# Patient Record
Sex: MALE | Race: OTHER | HISPANIC OR LATINO | URBAN - METROPOLITAN AREA
[De-identification: names, ages, dates, MRNs, and addresses within clinical notes are randomized per-mention and may not be internally consistent; named-entity substitution may affect disease eponyms.]

---

## 2018-07-04 ENCOUNTER — INPATIENT (INPATIENT)
Facility: HOSPITAL | Age: 43
LOS: 2 days | Discharge: HOME | DRG: 864 | End: 2018-07-07
Attending: INTERNAL MEDICINE | Admitting: INTERNAL MEDICINE
Payer: COMMERCIAL

## 2018-07-04 VITALS — HEIGHT: 62 IN | WEIGHT: 160.06 LBS

## 2018-07-04 DIAGNOSIS — R78.81 BACTEREMIA: ICD-10-CM

## 2018-07-04 DIAGNOSIS — Z29.9 ENCOUNTER FOR PROPHYLACTIC MEASURES, UNSPECIFIED: ICD-10-CM

## 2018-07-04 DIAGNOSIS — R50.9 FEVER, UNSPECIFIED: ICD-10-CM

## 2018-07-04 DIAGNOSIS — R79.89 OTHER SPECIFIED ABNORMAL FINDINGS OF BLOOD CHEMISTRY: ICD-10-CM

## 2018-07-04 LAB
ALBUMIN SERPL ELPH-MCNC: 3.5 G/DL — SIGNIFICANT CHANGE UP (ref 3.5–5)
ALP SERPL-CCNC: 71 U/L — SIGNIFICANT CHANGE UP (ref 40–120)
ALT FLD-CCNC: 92 U/L DA — HIGH (ref 10–60)
ANION GAP SERPL CALC-SCNC: 9 MMOL/L — SIGNIFICANT CHANGE UP (ref 5–17)
APPEARANCE UR: CLEAR — SIGNIFICANT CHANGE UP
AST SERPL-CCNC: 96 U/L — HIGH (ref 10–40)
BACTERIA # UR AUTO: ABNORMAL /HPF
BASOPHILS # BLD AUTO: 0 K/UL — SIGNIFICANT CHANGE UP (ref 0–0.2)
BASOPHILS NFR BLD AUTO: 1 % — SIGNIFICANT CHANGE UP (ref 0–2)
BILIRUB SERPL-MCNC: 0.5 MG/DL — SIGNIFICANT CHANGE UP (ref 0.2–1.2)
BILIRUB UR-MCNC: NEGATIVE — SIGNIFICANT CHANGE UP
BUN SERPL-MCNC: 10 MG/DL — SIGNIFICANT CHANGE UP (ref 7–18)
CALCIUM SERPL-MCNC: 7.9 MG/DL — LOW (ref 8.4–10.5)
CHLORIDE SERPL-SCNC: 104 MMOL/L — SIGNIFICANT CHANGE UP (ref 96–108)
CK SERPL-CCNC: 1100 U/L — HIGH (ref 35–232)
CO2 SERPL-SCNC: 26 MMOL/L — SIGNIFICANT CHANGE UP (ref 22–31)
COLOR SPEC: YELLOW — SIGNIFICANT CHANGE UP
CREAT SERPL-MCNC: 1.16 MG/DL — SIGNIFICANT CHANGE UP (ref 0.5–1.3)
DIFF PNL FLD: ABNORMAL
EOSINOPHIL # BLD AUTO: 0 K/UL — SIGNIFICANT CHANGE UP (ref 0–0.5)
EOSINOPHIL NFR BLD AUTO: 0.1 % — SIGNIFICANT CHANGE UP (ref 0–6)
GLUCOSE SERPL-MCNC: 87 MG/DL — SIGNIFICANT CHANGE UP (ref 70–99)
GLUCOSE UR QL: NEGATIVE — SIGNIFICANT CHANGE UP
HCT VFR BLD CALC: 45.1 % — SIGNIFICANT CHANGE UP (ref 39–50)
HGB BLD-MCNC: 14.8 G/DL — SIGNIFICANT CHANGE UP (ref 13–17)
KETONES UR-MCNC: NEGATIVE — SIGNIFICANT CHANGE UP
LACTATE SERPL-SCNC: 1.5 MMOL/L — SIGNIFICANT CHANGE UP (ref 0.7–2)
LEUKOCYTE ESTERASE UR-ACNC: NEGATIVE — SIGNIFICANT CHANGE UP
LYMPHOCYTES # BLD AUTO: 1.6 K/UL — SIGNIFICANT CHANGE UP (ref 1–3.3)
LYMPHOCYTES # BLD AUTO: 38.9 % — SIGNIFICANT CHANGE UP (ref 13–44)
MCHC RBC-ENTMCNC: 30.3 PG — SIGNIFICANT CHANGE UP (ref 27–34)
MCHC RBC-ENTMCNC: 32.9 GM/DL — SIGNIFICANT CHANGE UP (ref 32–36)
MCV RBC AUTO: 92 FL — SIGNIFICANT CHANGE UP (ref 80–100)
MONOCYTES # BLD AUTO: 0.6 K/UL — SIGNIFICANT CHANGE UP (ref 0–0.9)
MONOCYTES NFR BLD AUTO: 13.2 % — SIGNIFICANT CHANGE UP (ref 2–14)
NEUTROPHILS # BLD AUTO: 1.9 K/UL — SIGNIFICANT CHANGE UP (ref 1.8–7.4)
NEUTROPHILS NFR BLD AUTO: 46.8 % — SIGNIFICANT CHANGE UP (ref 43–77)
NITRITE UR-MCNC: NEGATIVE — SIGNIFICANT CHANGE UP
PH UR: 6.5 — SIGNIFICANT CHANGE UP (ref 5–8)
PLATELET # BLD AUTO: 214 K/UL — SIGNIFICANT CHANGE UP (ref 150–400)
POTASSIUM SERPL-MCNC: 3.9 MMOL/L — SIGNIFICANT CHANGE UP (ref 3.5–5.3)
POTASSIUM SERPL-SCNC: 3.9 MMOL/L — SIGNIFICANT CHANGE UP (ref 3.5–5.3)
PROT SERPL-MCNC: 7.4 G/DL — SIGNIFICANT CHANGE UP (ref 6–8.3)
PROT UR-MCNC: NEGATIVE — SIGNIFICANT CHANGE UP
RBC # BLD: 4.9 M/UL — SIGNIFICANT CHANGE UP (ref 4.2–5.8)
RBC # FLD: 11.7 % — SIGNIFICANT CHANGE UP (ref 10.3–14.5)
RBC CASTS # UR COMP ASSIST: SIGNIFICANT CHANGE UP /HPF (ref 0–2)
SODIUM SERPL-SCNC: 139 MMOL/L — SIGNIFICANT CHANGE UP (ref 135–145)
SP GR SPEC: 1.01 — SIGNIFICANT CHANGE UP (ref 1.01–1.02)
UROBILINOGEN FLD QL: NEGATIVE — SIGNIFICANT CHANGE UP
WBC # BLD: 4.2 K/UL — SIGNIFICANT CHANGE UP (ref 3.8–10.5)
WBC # FLD AUTO: 4.2 K/UL — SIGNIFICANT CHANGE UP (ref 3.8–10.5)
WBC UR QL: SIGNIFICANT CHANGE UP /HPF (ref 0–5)

## 2018-07-04 PROCEDURE — 71046 X-RAY EXAM CHEST 2 VIEWS: CPT | Mod: 26

## 2018-07-04 PROCEDURE — 99285 EMERGENCY DEPT VISIT HI MDM: CPT

## 2018-07-04 RX ORDER — ONDANSETRON 8 MG/1
4 TABLET, FILM COATED ORAL EVERY 6 HOURS
Qty: 0 | Refills: 0 | Status: DISCONTINUED | OUTPATIENT
Start: 2018-07-04 | End: 2018-07-07

## 2018-07-04 RX ORDER — SODIUM CHLORIDE 9 MG/ML
2500 INJECTION INTRAMUSCULAR; INTRAVENOUS; SUBCUTANEOUS ONCE
Qty: 0 | Refills: 0 | Status: COMPLETED | OUTPATIENT
Start: 2018-07-04 | End: 2018-07-04

## 2018-07-04 RX ORDER — SODIUM CHLORIDE 9 MG/ML
1000 INJECTION, SOLUTION INTRAVENOUS
Qty: 0 | Refills: 0 | Status: DISCONTINUED | OUTPATIENT
Start: 2018-07-04 | End: 2018-07-07

## 2018-07-04 RX ORDER — CEFTRIAXONE 500 MG/1
1 INJECTION, POWDER, FOR SOLUTION INTRAMUSCULAR; INTRAVENOUS ONCE
Qty: 0 | Refills: 0 | Status: COMPLETED | OUTPATIENT
Start: 2018-07-04 | End: 2018-07-04

## 2018-07-04 RX ORDER — IBUPROFEN 200 MG
400 TABLET ORAL EVERY 8 HOURS
Qty: 0 | Refills: 0 | Status: DISCONTINUED | OUTPATIENT
Start: 2018-07-04 | End: 2018-07-05

## 2018-07-04 RX ORDER — AZITHROMYCIN 500 MG/1
500 TABLET, FILM COATED ORAL ONCE
Qty: 0 | Refills: 0 | Status: COMPLETED | OUTPATIENT
Start: 2018-07-04 | End: 2018-07-04

## 2018-07-04 RX ORDER — IBUPROFEN 200 MG
600 TABLET ORAL ONCE
Qty: 0 | Refills: 0 | Status: COMPLETED | OUTPATIENT
Start: 2018-07-04 | End: 2018-07-04

## 2018-07-04 RX ORDER — ONDANSETRON 8 MG/1
4 TABLET, FILM COATED ORAL ONCE
Qty: 0 | Refills: 0 | Status: COMPLETED | OUTPATIENT
Start: 2018-07-04 | End: 2018-07-04

## 2018-07-04 RX ADMIN — SODIUM CHLORIDE 2500 MILLILITER(S): 9 INJECTION INTRAMUSCULAR; INTRAVENOUS; SUBCUTANEOUS at 13:50

## 2018-07-04 RX ADMIN — SODIUM CHLORIDE 75 MILLILITER(S): 9 INJECTION, SOLUTION INTRAVENOUS at 21:38

## 2018-07-04 RX ADMIN — Medication 600 MILLIGRAM(S): at 19:09

## 2018-07-04 RX ADMIN — AZITHROMYCIN 250 MILLIGRAM(S): 500 TABLET, FILM COATED ORAL at 17:16

## 2018-07-04 RX ADMIN — Medication 600 MILLIGRAM(S): at 17:18

## 2018-07-04 RX ADMIN — CEFTRIAXONE 100 GRAM(S): 500 INJECTION, POWDER, FOR SOLUTION INTRAMUSCULAR; INTRAVENOUS at 15:20

## 2018-07-04 RX ADMIN — ONDANSETRON 4 MILLIGRAM(S): 8 TABLET, FILM COATED ORAL at 17:20

## 2018-07-04 NOTE — ED PROVIDER NOTE - MUSCULOSKELETAL, MLM
Spine appears normal, range of motion is not limited, no muscle or joint tenderness. Pt is tremulous.

## 2018-07-04 NOTE — ED ADULT NURSE NOTE - ED STAT RN HANDOFF DETAILS 3
Patient resting comfortably  tolerated breakfast well, independent with ADL, endorsed to MARU Aponte

## 2018-07-04 NOTE — H&P ADULT - PROBLEM SELECTOR PLAN 3
IMPROVE VTE Individual Risk Assessment    RISK                                                          Points  [] Previous VTE                                           3  [] Thrombophilia                                        2  [] Lower limb paralysis                              2   [] Current Cancer                                       2   [] Immobilization > 24 hrs                        1  [] ICU/CCU stay > 24 hours                       1  [] Age > 60                                                   1    IMPROVE VTE Score: 0  no need for DVT ppx  no need for GI ppx

## 2018-07-04 NOTE — H&P ADULT - ASSESSMENT
Patient is a 42 y/o M from home, , with no significant PMH presents to ED c/o fever and chills. Pt also had associated headache,  body and muscle aches, nausea, NBNB vomiting X3, dry cough, diaphoresis, soft stool, no watery diarrhea, no wt loss. ER course, temp 101.6, HR 66, /83, improved to /60 w/o intervention. RR 18, SO2 97%. Labs wnl except LFT mild elevated , AST 96, ALT 92, CXR shows No evidence for focal infiltrate or lobar consolidation. UA negative. S/P rocephin 1gm x1 and Zithromax 500 mg x1 , NS bolus 2.5L in ER.     Pt will be admitted to medicine for further eval of fever of unknown origin. Patient is a 44 y/o M from home, , with no significant PMH presents to ED c/o fever and chills. Pt also had associated headache, body/muscle aches, joints pain, nausea, NBNB vomiting X3, dry cough, diaphoresis, soft stool, no watery diarrhea, no sore throat, no wt loss. ER course, temp 101.6, HR 66, /83, improved to /60 w/o intervention. RR 18, SO2 97%. Labs wnl except LFT mild elevated , AST 96, ALT 92, CXR shows No evidence for focal infiltrate or lobar consolidation. UA negative. S/P rocephin 1gm x1 and Zithromax 500 mg x1 , NS bolus 2.5L in ER.     Pt will be admitted to medicine for further eval of fever of unknown origin.

## 2018-07-04 NOTE — H&P ADULT - PROBLEM SELECTOR PLAN 2
mild elevated LFTs AST 96, ALT 92  deny drinks alcohol   f/u hepatitis panel and alcohol level  repeat LFT in am mild elevated LFTs AST 96, ALT 92  deny drinks alcohol   f/u hepatitis panel , alcohol level and lipid profile   repeat LFT in am

## 2018-07-04 NOTE — H&P ADULT - PROBLEM SELECTOR PLAN 1
fever, chills, headache,  body and muscle aches, nausea, NBNB vomiting X3, dry cough, diaphoresis, recent travelled to [place]  likely due to viral infection vs bacteria infection vs TB   febrile 101.6, no leukocytosis, UA negative   CXR shows No evidence for focal infiltrate or lobar consolidation  S/P rocephin 1gm x1 and Zithromax 500 mg x1 , NS bolus 2.5L in ER  f/u Blood culture  f/u HIV , QuantiFeron TB, RVP, hepatitis panel , Utox and lactate level   ID consulted with Dr. Zuniga fever, chills, headache,  body/muscle aches, joints pain, nausea, NBNB vomiting X3, dry cough, diaphoresis, recent travelled to [place]  likely due to viral infection vs bacteria infection vs TB   febrile 101.6, no leukocytosis, UA negative   CXR shows No evidence for focal infiltrate or lobar consolidation  S/P rocephin 1gm x1 and Zithromax 500 mg x1 , NS bolus 2.5L in ER  f/u Blood culture  f/u HIV , QuantiFeron TB, RVP, hepatitis panel , Utox and lactate level   hold ABx for now, will continue to monitor  c/w gentle hydration  ID consulted with Dr. Waters fever, chills, headache,  body/muscle aches, joints pain, nausea, NBNB vomiting X3, dry cough, diaphoresis, recent travelled to [place]  likely due to viral infection vs bacteria infection vs TB   febrile 101.6, no leukocytosis, UA negative , lactate NL  CXR shows No evidence for focal infiltrate or lobar consolidation  S/P rocephin 1gm x1 and Zithromax  1gm x1 , NS bolus 2.5L in ER  f/u Blood culture x2 set sent  f/u HIV , QuantiFeron TB, RVP, hepatitis panel , Utox , CK levels   f/u EBV serology, stool GI PCR panel and mycoplasma IgM titers   hold ABx for now, will continue to monitor  c/w gentle hydration  ID consulted with Dr. Waters

## 2018-07-04 NOTE — H&P ADULT - HISTORY OF PRESENT ILLNESS
44 y/o M pt with no significant PMHx presents to ED c/o body aches, chills, vomiting, and cough x 5 days. Pt was in [place] for 4 days, and got sick a day after being in [place]. While in [place], pt was started on a antiemetic and abx (Cipro). Pt states he did not finish course of medication because it made him feel worse. Pt denies any alcohol use, stating adamantly "I do not drink." Pt was evaluated by [facility] and sent to ED for further evaluation. Pt denies diarrhea, chest pain, shortness of breath, rash or any other complaints. ALLERGIES: penicillin (hives) Patient is a 44 y/o M from home, ,  with no significant PMH presents to ED c/o fever and chills. Pt was in [place] last Thursday and got sick on Saturday, he was started on a antiemetic and Cipro (took 2 days, not finish course of medication because it made him feel worse.) , body aches, chills, vomiting, and cough x 5 days. Pt was in [place] for 4 days, and got sick a day after being in [place]. While in [place], pt was started . Pt states he did not finish course of medication because it made him feel worse. Pt denies any alcohol use, stating adamantly "I do not drink." Pt was evaluated by [facility] and sent to ED for further evaluation. Pt denies diarrhea, chest pain, shortness of breath, rash or any other complaints. ALLERGIES: penicillin (hives) Patient is a 42 y/o M from home, , with no significant PMH presents to ED c/o fever and chills. Pt was in [place] last Thursday and got sick on Saturday, he was started on a antiemetic and Cipro (took 2 days, not finish course of medication because it made him feel worse.). Pt returned to [place] on Monday. Pt also had associated headache,  body and muscle aches, nausea, NBNB vomiting X3, dry cough, diaphoresis, soft stool, no watery diarrhea, no wt loss. Pt denies any alcohol use, drug abuse, never smoker, no sick contact. Pt was evaluated by [facility]  and sent to ED for further evaluation, Pt denies chest pain, SOB, abd pain, or any other complains.  ALLERGIES: penicillin (hives)    ER course, temp 101.6, HR 66, /83, improved to /60 w/o intervention. RR 18, SO2 97%. Labs wnl except LFT mild elevated , AST 96, ALT 92, CXR shows No evidence for focal infiltrate or lobar consolidation. UA negative. S/P rocephin 1gm x1 and Zithromax 500 mg x1 , NS bolus 2.5L in ER. Patient is a 42 y/o M from home, , with no significant PMH presents to ED c/o fever and chills. Pt was in [place] last Thursday and got sick on Saturday, he was started on a antiemetic and Cipro (took 2 days, not finish course of medication because it made him feel worse.). Pt returned to [place] on Monday. Pt also had associated headache, body/muscle aches, joints pain, nausea, NBNB vomiting X3, dry cough, diaphoresis, soft stool, no watery diarrhea, no sore throat, no wt loss. Pt denies any alcohol use, drug abuse, never smoker, no sick contact. Pt was evaluated by [facility]  and sent to ED for further evaluation, Pt denies chest pain, SOB, abd pain, or any other complains.  Allergic to penicillin (hives), pt not on any meds at home.    ER course, temp 101.6, HR 66, /83, improved to /60 w/o intervention. RR 18, SO2 97%. Labs wnl except LFT mild elevated , AST 96, ALT 92, CXR shows No evidence for focal infiltrate or lobar consolidation. UA negative. S/P rocephin 1gm x1 and Zithromax 500 mg x1 , NS bolus 2.5L in ER. Patient is a 44 y/o M from home, , with no significant PMH presents to ED c/o fever and chills. Pt was in [place] last Thursday and got sick on Saturday, he was started on a antiemetic and Cipro (took 2 days, not finish course of medication because it made him feel worse.). Pt returned to [place] on Monday. Pt also had associated headache, body/muscle aches, joints pain, nausea, NBNB vomiting X3, dry cough, diaphoresis, soft stool, no watery diarrhea, no sore throat, no wt loss. Pt denies any alcohol use, drug abuse, never smoker, no sick contact. Pt was evaluated by [facility]  and sent to ED for further evaluation, Pt denies chest pain, SOB, abd pain, or any other complains.  Allergic to penicillin (hives), pt not on any meds at home.    ER course, temp 101.6, HR 66, /83, improved to /60 w/o intervention. RR 18, SO2 97%. Labs wnl except LFT mild elevated , AST 96, ALT 92, CXR shows No evidence for focal infiltrate or lobar consolidation. UA negative. S/P rocephin 1gm x1 and Zithromax 1gm x1 , NS bolus 2.5L in ER.

## 2018-07-04 NOTE — ED PROVIDER NOTE - OBJECTIVE STATEMENT
44 y/o M pt with no significant PMHx presents to ED c/o body aches, chills, vomiting, and cough x 5 days. Pt was in [place] for 4 days, and got sick a day after being in [place]. While in [place], pt was started on a antiemetic and abx (Cipro). Pt states he did not finish course of medication because it made him feel worse. Pt denies any alcohol use, stating adamantly "I do not drink." Pt was evaluated by [facility] and sent to ED for further evaluation. Pt denies diarrhea, chest pain, shortness of breath, rash or any other complaints. ALLERGIES: penicillin (hives).

## 2018-07-04 NOTE — H&P ADULT - NSHPLABSRESULTS_GEN_ALL_CORE
Comprehensive Metabolic Panel (07.04.18 @ 14:17)    Sodium, Serum: 139 mmol/L    Potassium, Serum: 3.9 mmol/L    Chloride, Serum: 104 mmol/L    Carbon Dioxide, Serum: 26 mmol/L    Anion Gap, Serum: 9 mmol/L    Blood Urea Nitrogen, Serum: 10 mg/dL    Creatinine, Serum: 1.16 mg/dL    Glucose, Serum: 87 mg/dL    Calcium, Total Serum: 7.9 mg/dL    Protein Total, Serum: 7.4 g/dL    Albumin, Serum: 3.5 g/dL    Bilirubin Total, Serum: 0.5 mg/dL    Alkaline Phosphatase, Serum: 71 U/L    Aspartate Aminotransferase (AST/SGOT): 96 U/L    Alanine Aminotransferase (ALT/SGPT): 92 U/L DA    eGFR if Non : 77: Interpretative comment  The units for eGFR are ml/min/1.73m2 (normalized body surface area). The  eGFR is calculated from a serum creatinine using the CKD-EPI equation.  Other variables required for calculation are race, age and sex. Among  patients with chronic kidney disease (CKD), the eGFR is useful in  determining the stage of disease according to KDOQI CKD classification.  All eGFR results are reported numerically with the following  interpretation.          GFR                    With                 Without     (ml/min/1.73 m2)    Kidney Damage       Kidney Damage        >= 90                    Stage 1                     Normal        60-89                    Stage 2                     Decreased GFR        30-59     Stage 3                     Stage 3        15-29                    Stage 4                     Stage 4        < 15                      Stage 5                     Stage 5  Each stage of CKD assumes that the associated GFR level has been in  effect for at least 3 months. Determination of stages one and two (with  eGFR > 59 ml/min/m2) requires estimation of kidney damage for at least 3  months as defined by structural or functional abnormalities.  Limitations: All estimates of GFR will be less accurate for patients at  extremes of muscle mass (including but not limited to frail elderly,  critically ill, or cancer patients), those with unusual diets, and those  with conditions associated with reduced secretion or extrarenal  elimination of creatinine. The eGFR equation is not recommended for use  in patients with unstable creatinine levels. mL/min/1.73M2    eGFR if African American: 89 mL/min/1.73M2      Complete Blood Count + Automated Diff (07.04.18 @ 14:17)    WBC Count: 4.2 K/uL    RBC Count: 4.90 M/uL    Hemoglobin: 14.8 g/dL    Hematocrit: 45.1 %    Mean Cell Volume: 92.0 fl    Mean Cell Hemoglobin: 30.3 pg    Mean Cell Hemoglobin Conc: 32.9 gm/dL    Red Cell Distrib Width: 11.7 %    Platelet Count - Automated: 214 K/uL    Auto Neutrophil #: 1.9 K/uL    Auto Lymphocyte #: 1.6 K/uL    Auto Monocyte #: 0.6 K/uL    Auto Eosinophil #: 0.0 K/uL    Auto Basophil #: 0.0 K/uL    Auto Neutrophil %: 46.8: Differential percentages must be correlated with absolute numbers for  clinical significance. %    Auto Lymphocyte %: 38.9 %    Auto Monocyte %: 13.2 %    Auto Eosinophil %: 0.1 %    Auto Basophil %: 1.0 %      < from: Xray Chest 2 Views PA/Lat (07.04.18 @ 13:54) >    FINDINGS: Heart size appears within normal limits. No hilar or superior   mediastinal abnormalities are identified.    There is no evidence for focal infiltrate, lobar consolidation or   pulmonary edema. No pleural effusion or pneumothorax is demonstrated. No   mediastinal shift is noted. The visualized osseous structures appear   unremarkable.    IMPRESSION: No evidence for focal infiltrate or lobar consolidation.    < end of copied text >

## 2018-07-04 NOTE — ED ADULT NURSE NOTE - ED STAT RN HANDOFF DETAILS 2
Patient endorsed to DOUGLAS Espinoza in stable condition and in no acute distress. Pending bed assignment.

## 2018-07-05 LAB
24R-OH-CALCIDIOL SERPL-MCNC: 17.1 NG/ML — LOW (ref 30–80)
ALBUMIN SERPL ELPH-MCNC: 3.1 G/DL — LOW (ref 3.5–5)
ALP SERPL-CCNC: 64 U/L — SIGNIFICANT CHANGE UP (ref 40–120)
ALT FLD-CCNC: 79 U/L DA — HIGH (ref 10–60)
ANION GAP SERPL CALC-SCNC: 7 MMOL/L — SIGNIFICANT CHANGE UP (ref 5–17)
AST SERPL-CCNC: 71 U/L — HIGH (ref 10–40)
BASOPHILS # BLD AUTO: 0 K/UL — SIGNIFICANT CHANGE UP (ref 0–0.2)
BASOPHILS NFR BLD AUTO: 0.7 % — SIGNIFICANT CHANGE UP (ref 0–2)
BILIRUB SERPL-MCNC: 0.5 MG/DL — SIGNIFICANT CHANGE UP (ref 0.2–1.2)
BUN SERPL-MCNC: 8 MG/DL — SIGNIFICANT CHANGE UP (ref 7–18)
CALCIUM SERPL-MCNC: 8.1 MG/DL — LOW (ref 8.4–10.5)
CHLORIDE SERPL-SCNC: 108 MMOL/L — SIGNIFICANT CHANGE UP (ref 96–108)
CHOLEST SERPL-MCNC: 110 MG/DL — SIGNIFICANT CHANGE UP (ref 10–199)
CK SERPL-CCNC: 841 U/L — HIGH (ref 35–232)
CO2 SERPL-SCNC: 25 MMOL/L — SIGNIFICANT CHANGE UP (ref 22–31)
CREAT SERPL-MCNC: 0.95 MG/DL — SIGNIFICANT CHANGE UP (ref 0.5–1.3)
CULTURE RESULTS: NO GROWTH — SIGNIFICANT CHANGE UP
EBV EA AB SER IA-ACNC: <5 U/ML — SIGNIFICANT CHANGE UP
EBV EA AB TITR SER IF: POSITIVE
EBV EA IGG SER-ACNC: NEGATIVE — SIGNIFICANT CHANGE UP
EBV NA IGG SER IA-ACNC: 112 U/ML — HIGH
EBV PATRN SPEC IB-IMP: SIGNIFICANT CHANGE UP
EBV VCA IGG AVIDITY SER QL IA: POSITIVE
EBV VCA IGM SER IA-ACNC: 47.6 U/ML — HIGH
EBV VCA IGM SER IA-ACNC: <10 U/ML — SIGNIFICANT CHANGE UP
EBV VCA IGM TITR FLD: NEGATIVE — SIGNIFICANT CHANGE UP
EOSINOPHIL # BLD AUTO: 0 K/UL — SIGNIFICANT CHANGE UP (ref 0–0.5)
EOSINOPHIL NFR BLD AUTO: 0.4 % — SIGNIFICANT CHANGE UP (ref 0–6)
ETHANOL SERPL-MCNC: <10 MG/DL — SIGNIFICANT CHANGE UP (ref 0–10)
GLUCOSE SERPL-MCNC: 95 MG/DL — SIGNIFICANT CHANGE UP (ref 70–99)
HAV IGM SER-ACNC: SIGNIFICANT CHANGE UP
HBA1C BLD-MCNC: 5.6 % — SIGNIFICANT CHANGE UP (ref 4–5.6)
HBV CORE IGM SER-ACNC: SIGNIFICANT CHANGE UP
HBV SURFACE AG SER-ACNC: SIGNIFICANT CHANGE UP
HCT VFR BLD CALC: 44.8 % — SIGNIFICANT CHANGE UP (ref 39–50)
HCV AB S/CO SERPL IA: 0.08 S/CO — SIGNIFICANT CHANGE UP
HCV AB SERPL-IMP: SIGNIFICANT CHANGE UP
HDLC SERPL-MCNC: 32 MG/DL — LOW (ref 40–125)
HGB BLD-MCNC: 14.9 G/DL — SIGNIFICANT CHANGE UP (ref 13–17)
HIV 1 & 2 AB SERPL IA.RAPID: SIGNIFICANT CHANGE UP
LIPID PNL WITH DIRECT LDL SERPL: 58 MG/DL — SIGNIFICANT CHANGE UP
LYMPHOCYTES # BLD AUTO: 1.7 K/UL — SIGNIFICANT CHANGE UP (ref 1–3.3)
LYMPHOCYTES # BLD AUTO: 33.9 % — SIGNIFICANT CHANGE UP (ref 13–44)
M PNEUMO IGM SER-ACNC: 77 UNITS/ML — SIGNIFICANT CHANGE UP
MCHC RBC-ENTMCNC: 30.7 PG — SIGNIFICANT CHANGE UP (ref 27–34)
MCHC RBC-ENTMCNC: 33.3 GM/DL — SIGNIFICANT CHANGE UP (ref 32–36)
MCV RBC AUTO: 92.1 FL — SIGNIFICANT CHANGE UP (ref 80–100)
MONOCYTES # BLD AUTO: 0.5 K/UL — SIGNIFICANT CHANGE UP (ref 0–0.9)
MONOCYTES NFR BLD AUTO: 9.2 % — SIGNIFICANT CHANGE UP (ref 2–14)
MYCOPLASMA AG SPEC QL: NEGATIVE — SIGNIFICANT CHANGE UP
NEUTROPHILS # BLD AUTO: 2.8 K/UL — SIGNIFICANT CHANGE UP (ref 1.8–7.4)
NEUTROPHILS NFR BLD AUTO: 55.9 % — SIGNIFICANT CHANGE UP (ref 43–77)
PLATELET # BLD AUTO: 192 K/UL — SIGNIFICANT CHANGE UP (ref 150–400)
POTASSIUM SERPL-MCNC: 4.2 MMOL/L — SIGNIFICANT CHANGE UP (ref 3.5–5.3)
POTASSIUM SERPL-SCNC: 4.2 MMOL/L — SIGNIFICANT CHANGE UP (ref 3.5–5.3)
PROT SERPL-MCNC: 6.5 G/DL — SIGNIFICANT CHANGE UP (ref 6–8.3)
RAPID RVP RESULT: SIGNIFICANT CHANGE UP
RBC # BLD: 4.86 M/UL — SIGNIFICANT CHANGE UP (ref 4.2–5.8)
RBC # FLD: 12 % — SIGNIFICANT CHANGE UP (ref 10.3–14.5)
SODIUM SERPL-SCNC: 140 MMOL/L — SIGNIFICANT CHANGE UP (ref 135–145)
SPECIMEN SOURCE: SIGNIFICANT CHANGE UP
TOTAL CHOLESTEROL/HDL RATIO MEASUREMENT: 3.4 RATIO — SIGNIFICANT CHANGE UP (ref 3.4–9.6)
TRIGL SERPL-MCNC: 98 MG/DL — SIGNIFICANT CHANGE UP (ref 10–149)
TSH SERPL-MCNC: 0.52 UU/ML — SIGNIFICANT CHANGE UP (ref 0.34–4.82)
WBC # BLD: 5.1 K/UL — SIGNIFICANT CHANGE UP (ref 3.8–10.5)
WBC # FLD AUTO: 5.1 K/UL — SIGNIFICANT CHANGE UP (ref 3.8–10.5)

## 2018-07-05 PROCEDURE — 71046 X-RAY EXAM CHEST 2 VIEWS: CPT | Mod: 26

## 2018-07-05 PROCEDURE — 93010 ELECTROCARDIOGRAM REPORT: CPT

## 2018-07-05 RX ORDER — ACETAMINOPHEN 500 MG
650 TABLET ORAL EVERY 6 HOURS
Qty: 0 | Refills: 0 | Status: DISCONTINUED | OUTPATIENT
Start: 2018-07-05 | End: 2018-07-07

## 2018-07-05 RX ADMIN — SODIUM CHLORIDE 75 MILLILITER(S): 9 INJECTION, SOLUTION INTRAVENOUS at 07:06

## 2018-07-05 NOTE — PROGRESS NOTE ADULT - PROBLEM SELECTOR PLAN 1
RVP & acute hepatitis panel negative  CXR shows No evidence for focal infiltrate or lobar consolidation  S/p Rocephin and Zithromax currently not on abx   S/p NS bolus x i and maintenance  Bld cx & QuantiFeron TB, EBV serology, mycoplasma IgM titers pending-f/u results  Stool GI PCR panel not collected, requested again-f/u collection and results   ID following, appreciated RVP & acute hepatitis panel negative  CXR shows No evidence for focal infiltrate or lobar consolidation  S/p Rocephin and Zithromax currently not on abx   S/p NS bolus x i and maintenance  Bld cx & QuantiFeron TB, EBV serology, mycoplasma IgM titers pending-f/u results  Stool GI PCR panel not collected, requested again-f/u collection and results   ID following, appreciated-Requested repeat CXR b/c on ID resp exam reported crakles CXR ordered urgently

## 2018-07-05 NOTE — PROGRESS NOTE ADULT - ATTENDING COMMENTS
Agreed and concurred on the above.  As chest ascultation is revealing varied areas or congestion, rattles and crackles will proceed with CT Chest w/ IV contrast. Will cont to monitor.

## 2018-07-05 NOTE — CONSULT NOTE ADULT - SUBJECTIVE AND OBJECTIVE BOX
Time of visit:    CHIEF COMPLAINT: Patient is a 43y old  Male who presents with a chief complaint of fever , chills (2018 20:45)      HPI:  Patient is a 42 y/o M from home, , with no significant PMH presents to ED c/o fever and chills. Pt was in [employer] last Thursday and got sick on Saturday, he was started on a antiemetic and Cipro (took 2 days, not finish course of medication because it made him feel worse.). Pt returned to [place] on Monday. Pt also had associated headache, body/muscle aches, joints pain, nausea, NBNB vomiting X3, dry cough, diaphoresis, soft stool, no watery diarrhea, no sore throat, no wt loss. Pt denies any alcohol use, drug abuse, never smoker, no sick contact. Pt was evaluated by [facility]  and sent to ED for further evaluation, Pt denies chest pain, SOB, abd pain, or any other complains.  Allergic to penicillin (hives), pt not on any meds at home.    ER course, temp 101.6, HR 66, /83, improved to /60 w/o intervention. RR 18, SO2 97%. Labs wnl except LFT mild elevated , AST 96, ALT 92, CXR shows No evidence for focal infiltrate or lobar consolidation. UA negative. S/P rocephin 1gm x1 and Zithromax 1gm x1 , NS bolus 2.5L in ER. (2018 20:45)   Patient seen and examined.     PAST MEDICAL & SURGICAL HISTORY:  No pertinent past medical history  No significant past surgical history      Allergies    penicillin (Hives)    Intolerances        MEDICATIONS  (STANDING):  dextrose 5% + sodium chloride 0.9%. 1000 milliLiter(s) (75 mL/Hr) IV Continuous <Continuous>      MEDICATIONS  (PRN):  acetaminophen   Tablet 650 milliGRAM(s) Oral every 6 hours PRN For Temp greater than 38 C (100.4 F)  ondansetron Injectable 4 milliGRAM(s) IV Push every 6 hours PRN Nausea and/or Vomiting   Medications up to date at time of exam.    Medications up to date at time of exam.    FAMILY HISTORY:  No pertinent family history in first degree relatives      SOCIAL HISTORY  Smoking History: Denies smoking, nor second smoke exposure.  Living Condition: [   ] apartment, [   ] private house  Work History:   Travel History: denies recent travel  Illicit Substance Use: denies  Alcohol Use: denies    REVIEW OF SYSTEMS:    CONSTITUTIONAL:  Had episode of  fever, no fever on exam. No chills,  night sweats, weight loss    HEENT:  denies diplopia or blurred vision, sore throat or runny nose.    CARDIOVASCULAR:  denies pressure, squeezing, tightness, or heaviness about the chest; no palpitations.    RESPIRATORY:  No  SOB, UMAÑA, wheezing. Has non productive cough.    GASTROINTESTINAL:  denies abdominal pain, nausea, vomiting or diarrhea.    GENITOURINARY: denies dysuria, frequency or urgency.    NEUROLOGIC:  denies numbness, tingling, seizures or weakness.    PSYCHIATRIC:  denies disorder of thought or mood.    MSK: denies swelling, redness      PHYSICAL EXAMINATION:      Vital Signs Last 24 Hrs  T(C): 36.6 (2018 11:24), Max: 37.2 (2018 15:52)  T(F): 97.8 (2018 11:24), Max: 99 (2018 15:52)  HR: 69 (2018 11:24) (66 - 85)  BP: 108/59 (2018 11:24) (91/78 - 114/54)  BP(mean): --  RR: 16 (2018 11:24) (16 - 18)  SpO2: 98% (2018 11:24) (98% - 100%)   (if applicable)    General: Alert and oriented x 3. No acute distress. Able to answer question appropriately with no SOB.    HEENT: Normocephalic and atraumatic. No nasal tenderness.  Extraocular muscles are intact. Moist mucosa.     NECK: Supple, no palpable adenopathy.    LUNGS: Clear to auscultation, no wheezing, rales, or rhonchi. No use of accessory muscle.      HEART: S1 S2 Regular rate and no click/ rub.     ABDOMEN: Soft, nontender, and nondistended.  No hepatosplenomegaly is noted. Active bowel sounds.     EXTREMITIES: Without any cyanosis, clubbing, rash, lesions or edema.    NEUROLOGIC: Awake, alert, oriented.     SKIN: Warm and moist. Non diaphoretic.       LABS:                        14.9   5.1   )-----------( 192      ( 2018 05:43 )             44.8         140  |  108  |  8   ----------------------------<  95  4.2   |  25  |  0.95    Ca    8.1<L>      2018 05:43    TPro  6.5  /  Alb  3.1<L>  /  TBili  0.5  /  DBili  0.2  /  AST  71<H>  /  ALT  79<H>  /  AlkPhos  64  -      Urinalysis Basic - ( 2018 14:17 )    Color: Yellow / Appearance: Clear / S.010 / pH: x  Gluc: x / Ketone: Negative  / Bili: Negative / Urobili: Negative   Blood: x / Protein: Negative / Nitrite: Negative   Leuk Esterase: Negative / RBC: 0-2 /HPF / WBC 0-2 /HPF   Sq Epi: x / Non Sq Epi: x / Bacteria: Trace /HPF        CARDIAC MARKERS ( 2018 05:43 )  x     / x     / 841 U/L / x     / x      CARDIAC MARKERS ( 2018 22:11 )  x     / x     / 1100 U/L / x     / x        RADIOLOGY & ADDITIONAL STUDIES:  EKG:   CXR: < from: Xray Chest 2 Views PA/Lat (18 @ 13:54) >  INTERPRETATION:  INDICATION: fever and chills    PRIORS: None    VIEWS: Frontal and lateral radiographs of the chest performed.    FINDINGS: Heart size appears within normal limits. No hilar or superior   mediastinal abnormalities are identified.    There is no evidence for focal infiltrate, lobar consolidation or   pulmonary edema. No pleural effusion or pneumothorax is demonstrated. No   mediastinal shift is noted. The visualized osseous structures appear   unremarkable.    IMPRESSION: No evidence for focal infiltrate or lobar consolidation.     PAST MEDICAL & SURGICAL HISTORY:  No pertinent past medical history  No significant past surgical history    Impression; 42 Y/O Male from Home. Presented with fever and chills associated with body ache, headache, joint pain, nausea, diaphoresis . Has dry non productive cough. Just got back from [place] and claimed that he had PPD test couple of years back and result is negative. Sleeps 1 pillow and had sick contact when he was in [place]. He verbalized that he felt sick and did not finish his course of antibiotic because it made him feel worse. CXR with no acute findings. RVP negative.       Suggestion;    Can have Oxygen supplementation if needed.   BLD Cx, Quantiferon TB pending results.   Reinforced importance of compliance to Daily medications. Time of visit:    CHIEF COMPLAINT: Patient is a 43y old  Male who presents with a chief complaint of fever , chills (2018 20:45)      HPI:  Patient is a 42 y/o M from home, , with no significant PMH presents to ED c/o fever and chills. Pt was in [place] last Thursday and got sick on Saturday, he was started on a antiemetic and Cipro (took 2 days, not finish course of medication because it made him feel worse.). Pt returned to [place] on Monday. Pt also had associated headache, body/muscle aches, joints pain, nausea, NBNB vomiting X3, dry cough, diaphoresis, soft stool, no watery diarrhea, no sore throat, no wt loss. Pt denies any alcohol use, drug abuse, never smoker, no sick contact. Pt was evaluated by [facility]  and sent to ED for further evaluation, Pt denies chest pain, SOB, abd pain, or any other complains.  Allergic to penicillin (hives), pt not on any meds at home.    ER course, temp 101.6, HR 66, /83, improved to /60 w/o intervention. RR 18, SO2 97%. Labs wnl except LFT mild elevated , AST 96, ALT 92, CXR shows No evidence for focal infiltrate or lobar consolidation. UA negative. S/P rocephin 1gm x1 and Zithromax 1gm x1 , NS bolus 2.5L in ER. (2018 20:45)   Patient seen and examined.     PAST MEDICAL & SURGICAL HISTORY:  No pertinent past medical history  No significant past surgical history      Allergies    penicillin (Hives)    Intolerances        MEDICATIONS  (STANDING):  dextrose 5% + sodium chloride 0.9%. 1000 milliLiter(s) (75 mL/Hr) IV Continuous <Continuous>      MEDICATIONS  (PRN):  acetaminophen   Tablet 650 milliGRAM(s) Oral every 6 hours PRN For Temp greater than 38 C (100.4 F)  ondansetron Injectable 4 milliGRAM(s) IV Push every 6 hours PRN Nausea and/or Vomiting   Medications up to date at time of exam.    Medications up to date at time of exam.    FAMILY HISTORY:  No pertinent family history in first degree relatives      SOCIAL HISTORY  Smoking History: Denies smoking, nor second smoke exposure.  Living Condition: [   ] apartment, [   ] private house  Work History:   Travel History: denies recent travel  Illicit Substance Use: denies  Alcohol Use: denies    REVIEW OF SYSTEMS:    CONSTITUTIONAL:  Had episode of  fever, no fever on exam. No chills,  night sweats, weight loss    HEENT:  denies diplopia or blurred vision, sore throat or runny nose.    CARDIOVASCULAR:  denies pressure, squeezing, tightness, or heaviness about the chest; no palpitations.    RESPIRATORY:  No  SOB, UMAÑA, wheezing. Has non productive cough.    GASTROINTESTINAL:  denies abdominal pain, nausea, vomiting or diarrhea.    GENITOURINARY: denies dysuria, frequency or urgency.    NEUROLOGIC:  denies numbness, tingling, seizures or weakness.    PSYCHIATRIC:  denies disorder of thought or mood.    MSK: denies swelling, redness      PHYSICAL EXAMINATION:      Vital Signs Last 24 Hrs  T(C): 36.6 (2018 11:24), Max: 37.2 (2018 15:52)  T(F): 97.8 (2018 11:24), Max: 99 (2018 15:52)  HR: 69 (2018 11:24) (66 - 85)  BP: 108/59 (2018 11:24) (91/78 - 114/54)  BP(mean): --  RR: 16 (2018 11:24) (16 - 18)  SpO2: 98% (2018 11:24) (98% - 100%)   (if applicable)    General: Alert and oriented x 3. No acute distress. Able to answer question appropriately with no SOB.    HEENT: Normocephalic and atraumatic. No nasal tenderness.  Extraocular muscles are intact. Moist mucosa.     NECK: Supple, no palpable adenopathy.    LUNGS: Clear to auscultation, no wheezing, rales, or rhonchi. No use of accessory muscle.      HEART: S1 S2 Regular rate and no click/ rub.     ABDOMEN: Soft, nontender, and nondistended.  No hepatosplenomegaly is noted. Active bowel sounds.     EXTREMITIES: Without any cyanosis, clubbing, rash, lesions or edema.    NEUROLOGIC: Awake, alert, oriented.     SKIN: Warm and moist. Non diaphoretic.       LABS:                        14.9   5.1   )-----------( 192      ( 2018 05:43 )             44.8         140  |  108  |  8   ----------------------------<  95  4.2   |  25  |  0.95    Ca    8.1<L>      2018 05:43    TPro  6.5  /  Alb  3.1<L>  /  TBili  0.5  /  DBili  0.2  /  AST  71<H>  /  ALT  79<H>  /  AlkPhos  64  -      Urinalysis Basic - ( 2018 14:17 )    Color: Yellow / Appearance: Clear / S.010 / pH: x  Gluc: x / Ketone: Negative  / Bili: Negative / Urobili: Negative   Blood: x / Protein: Negative / Nitrite: Negative   Leuk Esterase: Negative / RBC: 0-2 /HPF / WBC 0-2 /HPF   Sq Epi: x / Non Sq Epi: x / Bacteria: Trace /HPF        CARDIAC MARKERS ( 2018 05:43 )  x     / x     / 841 U/L / x     / x      CARDIAC MARKERS ( 2018 22:11 )  x     / x     / 1100 U/L / x     / x        RADIOLOGY & ADDITIONAL STUDIES:  EKG:   CXR: < from: Xray Chest 2 Views PA/Lat (18 @ 13:54) >  INTERPRETATION:  INDICATION: fever and chills    PRIORS: None    VIEWS: Frontal and lateral radiographs of the chest performed.    FINDINGS: Heart size appears within normal limits. No hilar or superior   mediastinal abnormalities are identified.    There is no evidence for focal infiltrate, lobar consolidation or   pulmonary edema. No pleural effusion or pneumothorax is demonstrated. No   mediastinal shift is noted. The visualized osseous structures appear   unremarkable.    IMPRESSION: No evidence for focal infiltrate or lobar consolidation.     PAST MEDICAL & SURGICAL HISTORY:  No pertinent past medical history  No significant past surgical history    Impression; 44 Y/O Male from Home. Presented with fever and chills associated with body ache, headache, joint pain, nausea, diaphoresis . Has dry non productive cough. Just got back from [place] and claimed that he had PPD test couple of years back and result is negative. Sleeps 1 pillow and had sick contact when he was in [place]. He verbalized that he felt sick and did not finish his course of antibiotic because it made him feel worse. CXR with no acute findings. RVP negative.  FUO, gastroenteritis ? etiology      Suggestion;    Can have Oxygen supplementation if needed.   BLD Cx, Quantiferon TB pending results.   Reinforced importance of compliance to Daily medications.     stool for cultures o/p

## 2018-07-05 NOTE — CONSULT NOTE ADULT - SUBJECTIVE AND OBJECTIVE BOX
HPI:  Patient is a 42 y/o M from home, , with no significant PMH presents to ED c/o fever and chills. Pt was in [place] last Thursday and got sick on Saturday with vomiting , he was started on a antiemetic and Cipro (took 2 days, not finish course of medication because it made him feel worse.). Pt returned to [place] on Monday. Pt also had associated headache, body/muscle aches, joints pain, nausea, NBNB vomiting X3, dry cough, diaphoresis, some  watery diarrhea, no sore throat, no wt loss. Pt denies any alcohol use, drug abuse, never smoker, no sick contact. Pt was evaluated by [facility]  and sent to ED for further evaluation, Pt denies chest pain, SOB, abd pain, or any other complains.  Allergic to penicillin (hives), pt not on any meds at home.    ER course, temp 101.6, HR 66, /83, improved to /60 w/o intervention. RR 18, SO2 97%. Labs wnl except LFT mild elevated , AST 96, ALT 92, CXR shows No evidence for focal infiltrate or lobar consolidation. UA negative. S/P rocephin 1gm x1 and Zithromax 1gm x1 , NS bolus 2.5L in ER. (2018 20:45). ID called for further eval . currently coughing without sputum       PAST MEDICAL & SURGICAL HISTORY:  No pertinent past medical history  No significant past surgical history    allergy  penicillin (Hives)    meds   acetaminophen   Tablet 650 milliGRAM(s) Oral every 6 hours PRN  dextrose 5% + sodium chloride 0.9%. 1000 milliLiter(s) IV Continuous <Continuous>  ondansetron Injectable 4 milliGRAM(s) IV Push every 6 hours PRN      Social Hx: no smoking no etoh     FAMILY HISTORY:  No pertinent family history in first degree relatives        ROS  [  ] UNABLE TO ELICIT    General:  [x  ] Fever   [ x ] Malaise    Skin:no rash     HEENT:  [ - ] Sore Throat  [ - ] Photophobia	    Chest: [ - ] SOB  [ x ] Cough     Cardiovascular: [  -] CP	no pnd no orthopnea     Gastrointestinal: [-  ] Abd pain   [x  ] N    [x  ] V   [ x ] Diarrhea	    Genitourinary: [ -- ] Polyuria   [  ] Urgency   [ - ] Dysuria    [ - ]  Hematuria	    Musculoskeletal:  [-  ] Back Pain	    Neurological: [ - ]Dizziness  [ - ]Visual Disturbance  [ - ]Headaches      PHYSICAL EXAM:    Vital Signs Last 24 Hrs  T(C): 36.4 (2018 15:31), Max: 37 (2018 23:46)  T(F): 97.5 (2018 15:31), Max: 98.6 (2018 23:46)  HR: 69 (2018 15:31) (66 - 85)  BP: 113/62 (2018 15:31) (91/78 - 114/54)  BP(mean): --  RR: 16 (2018 15:31) (16 - 17)  SpO2: 97% (2018 15:31) (97% - 98%)    Constitutional: awake alert oriented times 3   BRENTON SCLERA anicteric EOMI   LUNGS bilateral rales   CVS s1 s2 aud no murmurs   ABDOMEN soft non tender no HSM   NEUROLOGY  no defecits   SKIN no rash   EXTREMITIES no edema no cyanosis         LABS/DIAGNOSTIC TESTS                          14.9   5.1   )-----------( 192      ( 2018 05:43 )             44.8     WBC Count: 5.1 K/uL ( @ 05:43)  WBC Count: 4.2 K/uL ( @ 14:17)          140  |  108  |  8   ----------------------------<  95  4.2   |  25  |  0.95    Ca    8.1<L>      2018 05:43    TPro  6.5  /  Alb  3.1<L>  /  TBili  0.5  /  DBili  0.2  /  AST  71<H>  /  ALT  79<H>  /  AlkPhos  64        Urinalysis Basic - ( 2018 14:17 )    Color: Yellow / Appearance: Clear / S.010 / pH: x  Gluc: x / Ketone: Negative  / Bili: Negative / Urobili: Negative   Blood: x / Protein: Negative / Nitrite: Negative   Leuk Esterase: Negative / RBC: 0-2 /HPF / WBC 0-2 /HPF   Sq Epi: x / Non Sq Epi: x / Bacteria: Trace /HPF        LIVER FUNCTIONS - ( 2018 05:43 )  Alb: 3.1 g/dL / Pro: 6.5 g/dL / ALK PHOS: 64 U/L / ALT: 79 U/L DA / AST: 71 U/L / GGT: x                 LACTATE: Lactate, Blood (18 @ 14:17)    Lactate, Blood: 1.5 mmol/L                RADIOLOGY  < from: Xray Chest 2 Views PA/Lat (18 @ 13:54) >  EXAM:  XR CHEST PA LAT 2V                            PROCEDURE DATE:  2018          INTERPRETATION:  INDICATION: fever and chills    PRIORS: None    VIEWS: Frontal and lateral radiographs of the chest performed.    FINDINGS: Heart size appears within normal limits. No hilar or superior   mediastinal abnormalities are identified.    There is no evidence for focal infiltrate, lobar consolidation or   pulmonary edema. No pleural effusion or pneumothorax is demonstrated. No   mediastinal shift is noted. The visualized osseous structures appear   unremarkable.    IMPRESSION: No evidence for focal infiltrate or lobar consolidation.                ELVIRA BECKFORD   This document has been electronically signed. 2018  1:59PM    < end of copied text >

## 2018-07-05 NOTE — CONSULT NOTE ADULT - ASSESSMENT
Patient is a 44 y/o M from home, , with no significant PMH presents to ED c/o fever and chills. Pt was in [place] last Thursday and got sick on Saturday with vomiting , he was started on a antiemetic and Cipro (took 2 days, not finish course of medication because it made him feel worse.). Pt returned to [place] on Monday. Pt also had associated headache, body/muscle aches, joints pain, nausea, NBNB vomiting X3, dry cough, diaphoresis, some  watery diarrhea, no sore throat, no wt loss. Pt denies any alcohol use, drug abuse, never smoker, no sick contact. Pt was evaluated by [facility]  and sent to ED for further evaluation, Pt denies chest pain, SOB, abd pain, or any other complains.  Allergic to penicillin (hives), pt not on any meds at home.    ER course, temp 101.6, HR 66, /83, improved to /60 w/o intervention. RR 18, SO2 97%. Labs wnl except LFT mild elevated , AST 96, ALT 92, CXR shows No evidence for focal infiltrate or lobar consolidation. UA negative. S/P rocephin 1gm x1 and Zithromax 1gm x1 , NS bolus 2.5L in ER. (04 Jul 2018 20:45)    # gastroenteritis viral vs bacterial .rvp neg     plan   stool GI pcr   rpt cxr /may need ct chest   blood cx times 2 f/u   f/u urine cx   EBV SEROLOGY   SERUM MYCOPLASMA IGM   HOLD OFF AB FOR NOW     THNX WILL F/U   D/W INTERN

## 2018-07-05 NOTE — PROGRESS NOTE ADULT - SUBJECTIVE AND OBJECTIVE BOX
NP Note discussed with  primary attending    43y old Male who presented with fever & chills.  LOS #1.  States to "feel a little better than yesterday."  Reports cough, nonproductive.  Denies HA, dizziness, SOB, CP or abdominal pain.  LBM today, loose but not diarrhea.        INTERVAL HPI/OVERNIGHT EVENTS: no new complaints    MEDICATIONS  (STANDING):  dextrose 5% + sodium chloride 0.9%. 1000 milliLiter(s) (75 mL/Hr) IV Continuous <Continuous>    MEDICATIONS  (PRN):  acetaminophen   Tablet 650 milliGRAM(s) Oral every 6 hours PRN For Temp greater than 38 C (100.4 F)  ondansetron Injectable 4 milliGRAM(s) IV Push every 6 hours PRN Nausea and/or Vomiting      __________________________________________________  REVIEW OF SYSTEMS:    CONSTITUTIONAL: No fever  EYES: No acute visual disturbances  NECK: No pain or stiffness  RESPIRATORY: No cough; No shortness of breath  CARDIOVASCULAR: No chest pain, no palpitations  GASTROINTESTINAL: No pain. No nausea or vomiting.  No diarrhea   NEUROLOGICAL: No headache or numbness, no tremors  MUSCULOSKELETAL: No joint pain, no muscle pain  GENITOURINARY: No dysuria, no frequency, no hesitancy  PSYCHIATRY: No depression , no anxiety  ALL OTHER  ROS negative        Vital Signs Last 24 Hrs  T(C): 36.6 (2018 11:24), Max: 37.2 (2018 15:52)  T(F): 97.8 (2018 11:24), Max: 99 (2018 15:52)  HR: 69 (2018 11:24) (66 - 85)  BP: 108/59 (2018 11:24) (91/78 - 114/54)  BP(mean): --  RR: 16 (2018 11:24) (16 - 18)  SpO2: 98% (2018 11:24) (98% - 100%)    ________________________________________________  PHYSICAL EXAM:  GENERAL: NAD  HEENT: Normocephalic;  conjunctivae and sclerae clear; moist mucous membranes;   NECK : supple  CHEST/LUNG: Clear to auscultation bilaterally with good air entry   HEART: S1 S2  regular; no murmurs, gallops or rubs  ABDOMEN: Soft, Nontender, Nondistended; Bowel sounds present x 4 quad  EXTREMITIES: No cyanosis; no edema; no calf tenderness  SKIN: Warm and dry; no rash  NERVOUS SYSTEM:  Awake and alert; Oriented  to place, person and time ; no new deficits    _________________________________________________  LABS:                        14.9   5.1   )-----------( 192      ( 2018 05:43 )             44.8     07-05    140  |  108  |  8   ----------------------------<  95  4.2   |  25  |  0.95    Ca    8.1<L>      2018 05:43    TPro  6.5  /  Alb  3.1<L>  /  TBili  0.5  /  DBili  0.2  /  AST  71<H>  /  ALT  79<H>  /  AlkPhos  64  07-05      Urinalysis Basic - ( 2018 14:17 )    Color: Yellow / Appearance: Clear / S.010 / pH: x  Gluc: x / Ketone: Negative  / Bili: Negative / Urobili: Negative   Blood: x / Protein: Negative / Nitrite: Negative   Leuk Esterase: Negative / RBC: 0-2 /HPF / WBC 0-2 /HPF   Sq Epi: x / Non Sq Epi: x / Bacteria: Trace /HPF      Consultant(s) Notes Reviewed:   YES    Care Discussed with Consultants :     Plan of care was discussed with patient and /or primary care giver; all questions and concerns were addressed and care was aligned with patient's wishes.

## 2018-07-05 NOTE — PATIENT PROFILE ADULT. - LIVES WITH, PROFILE
55 yo female s/p HYST/BSO who presents for routine gyn visit.  Wants refills on estradiol tablets for hot flashes.  NO new sexual partners since last visit here.  Complains of hemorrhoids causing problems with heavy lifting.  Positive tobacco use.     ROS:  GENERAL: Denies weight gain or weight loss. Feeling well overall.   SKIN: Denies rash or lesions.   CHEST: Denies chest pain or shortness of breath.   CARDIOVASCULAR: Denies palpitations or left sided chest pain.   ABDOMEN: No abdominal pain,  diarrhea, nausea, vomiting or rectal bleeding,   URINARY: per HP  REPRODUCTIVE: No complaints  BREASTS: denies pain, lumps, or nipple discharge.   HEMATOLOGIC: No easy bruisability or excessive bleeding.   MUSCULOSKELETAL: Denies joint pain or swelling.   NEUROLOGIC: Denies syncope or weakness.   PSYCHIATRIC: Denies depression, anxiety or mood swings.         PE:   /62   Wt 76.8 kg (169 lb 5 oz)   LMP  (LMP Unknown)   BMI 27.33 kg/m²   APPEARANCE: Well nourished, well developed, in no acute distress.  SKIN: Normal skin turgor, no lesions.  CHEST: Lungs clear to auscultation.  HEART: Regular rate and rhythm, no murmurs, rubs or gallops.  ABDOMEN: Soft. No tenderness or masses. No hepatosplenomegaly. No hernias.  BREASTS: Symmetrical, no skin changes or visible lesions. No palpable masses, nipple discharge or adenopathy bilaterally.  PELVIC: Normal external female genitalia without lesions. Normal hair distribution. Adequate perineal body, normal urethral meatus. Atrophic vagina without lesions or discharge. Uterus/cervix surgically absent; No significant cystocele or rectocele. Bimanual exam showed vaginal cuff intact, nontender. Adnexa without masses or tenderness. Urethra and bladder normal.  EXTREMITIES: No clubbing cyanosis or edema.     AP  Routine gyn  -s/p normal breast exam: mammogram uptodate  -s/p normal pelvic exam  -STD testing: declined  -DEXA: ordered  -colonoscopy uptodate     MERLE velasco MD     wife presently in [facility]/spouse/parents

## 2018-07-06 ENCOUNTER — TRANSCRIPTION ENCOUNTER (OUTPATIENT)
Age: 43
End: 2018-07-06

## 2018-07-06 LAB
ALBUMIN SERPL ELPH-MCNC: 3.2 G/DL — LOW (ref 3.5–5)
ALP SERPL-CCNC: 72 U/L — SIGNIFICANT CHANGE UP (ref 40–120)
ALT FLD-CCNC: 80 U/L DA — HIGH (ref 10–60)
ANION GAP SERPL CALC-SCNC: 7 MMOL/L — SIGNIFICANT CHANGE UP (ref 5–17)
AST SERPL-CCNC: 62 U/L — HIGH (ref 10–40)
BASOPHILS # BLD AUTO: 0 K/UL — SIGNIFICANT CHANGE UP (ref 0–0.2)
BASOPHILS NFR BLD AUTO: 0.3 % — SIGNIFICANT CHANGE UP (ref 0–2)
BILIRUB SERPL-MCNC: 0.5 MG/DL — SIGNIFICANT CHANGE UP (ref 0.2–1.2)
BUN SERPL-MCNC: 9 MG/DL — SIGNIFICANT CHANGE UP (ref 7–18)
CALCIUM SERPL-MCNC: 8.6 MG/DL — SIGNIFICANT CHANGE UP (ref 8.4–10.5)
CHLORIDE SERPL-SCNC: 106 MMOL/L — SIGNIFICANT CHANGE UP (ref 96–108)
CO2 SERPL-SCNC: 27 MMOL/L — SIGNIFICANT CHANGE UP (ref 22–31)
CREAT SERPL-MCNC: 0.96 MG/DL — SIGNIFICANT CHANGE UP (ref 0.5–1.3)
EOSINOPHIL # BLD AUTO: 0.1 K/UL — SIGNIFICANT CHANGE UP (ref 0–0.5)
EOSINOPHIL NFR BLD AUTO: 1.4 % — SIGNIFICANT CHANGE UP (ref 0–6)
GLUCOSE SERPL-MCNC: 124 MG/DL — HIGH (ref 70–99)
HCT VFR BLD CALC: 46.5 % — SIGNIFICANT CHANGE UP (ref 39–50)
HGB BLD-MCNC: 15.3 G/DL — SIGNIFICANT CHANGE UP (ref 13–17)
LYMPHOCYTES # BLD AUTO: 2.1 K/UL — SIGNIFICANT CHANGE UP (ref 1–3.3)
LYMPHOCYTES # BLD AUTO: 54.8 % — HIGH (ref 13–44)
MCHC RBC-ENTMCNC: 30.1 PG — SIGNIFICANT CHANGE UP (ref 27–34)
MCHC RBC-ENTMCNC: 32.8 GM/DL — SIGNIFICANT CHANGE UP (ref 32–36)
MCV RBC AUTO: 91.7 FL — SIGNIFICANT CHANGE UP (ref 80–100)
MONOCYTES # BLD AUTO: 0.3 K/UL — SIGNIFICANT CHANGE UP (ref 0–0.9)
MONOCYTES NFR BLD AUTO: 6.8 % — SIGNIFICANT CHANGE UP (ref 2–14)
NEUTROPHILS # BLD AUTO: 1.4 K/UL — LOW (ref 1.8–7.4)
NEUTROPHILS NFR BLD AUTO: 36.8 % — LOW (ref 43–77)
PLATELET # BLD AUTO: 212 K/UL — SIGNIFICANT CHANGE UP (ref 150–400)
POTASSIUM SERPL-MCNC: 3.9 MMOL/L — SIGNIFICANT CHANGE UP (ref 3.5–5.3)
POTASSIUM SERPL-SCNC: 3.9 MMOL/L — SIGNIFICANT CHANGE UP (ref 3.5–5.3)
PROCALCITONIN SERPL-MCNC: 0.06 NG/ML — SIGNIFICANT CHANGE UP (ref 0.02–0.1)
PROT SERPL-MCNC: 7 G/DL — SIGNIFICANT CHANGE UP (ref 6–8.3)
RBC # BLD: 5.07 M/UL — SIGNIFICANT CHANGE UP (ref 4.2–5.8)
RBC # FLD: 11.8 % — SIGNIFICANT CHANGE UP (ref 10.3–14.5)
SODIUM SERPL-SCNC: 140 MMOL/L — SIGNIFICANT CHANGE UP (ref 135–145)
WBC # BLD: 3.9 K/UL — SIGNIFICANT CHANGE UP (ref 3.8–10.5)
WBC # FLD AUTO: 3.9 K/UL — SIGNIFICANT CHANGE UP (ref 3.8–10.5)

## 2018-07-06 PROCEDURE — 71260 CT THORAX DX C+: CPT | Mod: 26

## 2018-07-06 RX ORDER — ERGOCALCIFEROL 1.25 MG/1
50000 CAPSULE ORAL
Qty: 0 | Refills: 0 | Status: DISCONTINUED | OUTPATIENT
Start: 2018-07-06 | End: 2018-07-07

## 2018-07-06 RX ADMIN — ONDANSETRON 4 MILLIGRAM(S): 8 TABLET, FILM COATED ORAL at 22:06

## 2018-07-06 NOTE — DIETITIAN INITIAL EVALUATION ADULT. - PERTINENT LABORATORY DATA
07-06 Na140 mmol/L Glu 124 mg/dL<H> K+ 3.9 mmol/L Cr  0.96 mg/dL BUN 9 mg/dL 07-06 Alb 3.2 g/dL<L> 07-05 [id number] 5.6 % 07-05 Chol 110 mg/dL LDL 58 mg/dL HDL 32 mg/dL<L> Trig 98 mg/dL

## 2018-07-06 NOTE — DISCHARGE NOTE ADULT - HOSPITAL COURSE
Patient is a 44 y/o M from home, , with no significant PMH presents to ED c/o fever and chills. Pt also had associated headache, body/muscle aches, joints pain, nausea, NBNB vomiting X3, dry cough, diaphoresis, soft stool, no watery diarrhea, no sore throat, no wt loss.   Patient was admitted for evaluation for fever, chills, headache,  body/muscle aches, joints pain, nausea, NBNB vomiting X3, dry cough, diaphoresis, likely due to viral infection.  patient was febrile 101.6, no leukocytosis, UA negative , lactate NL CXR shows No evidence for focal infiltrate or lobar consolidation. f/u Blood culture x2 negative. f/u HIV negative , QuantiFeron TB testing, (follow up as outpatient), RVP negative. Hold ABx for now, will continue to monitor. Patient improved with gentle hydration. ID consulted with Dr. Waters. Patient is a 42 y/o M from home, , with no significant PMH presents to ED c/o fever and chills. Pt also had associated headache, body/muscle aches, joints pain, nausea, NBNB vomiting X3, dry cough, diaphoresis, soft stool, no watery diarrhea, no sore throat, no wt loss.   Patient was admitted for evaluation for fever, chills, headache,  body/muscle aches, joints pain, nausea, NBNB vomiting X3, dry cough, diaphoresis, likely due to viral infection.  patient was febrile 101.6, no leukocytosis, UA negative , lactate NL CXR shows No evidence for focal infiltrate or lobar consolidation. f/u Blood culture x2 negative. f/u HIV negative , QuantiFeron TB negative, RVP negative. Hold ABx for now, will continue to monitor. Patient improved with gentle hydration. ID consulted with Dr. Waters. Patient to have Echo as outpatient.   Stable for discharge as per attending

## 2018-07-06 NOTE — PROGRESS NOTE ADULT - SUBJECTIVE AND OBJECTIVE BOX
Time of Visit:  Patient seen and examined.     MEDICATIONS  (STANDING):  dextrose 5% + sodium chloride 0.9%. 1000 milliLiter(s) (75 mL/Hr) IV Continuous <Continuous>      MEDICATIONS  (PRN):  acetaminophen   Tablet 650 milliGRAM(s) Oral every 6 hours PRN For Temp greater than 38 C (100.4 F)  ondansetron Injectable 4 milliGRAM(s) IV Push every 6 hours PRN Nausea and/or Vomiting       Medications up to date at time of exam.    ROS: No fever, chills, SOB, cough, congestion.  PHYSICAL EXAMINATION:  Vital Signs Last 24 Hrs  T(C): 36.9 (2018 05:26), Max: 36.9 (2018 01:11)  T(F): 98.4 (2018 05:26), Max: 98.5 (2018 01:11)  HR: 65 (2018 05:26) (61 - 69)  BP: 107/60 (2018 05:26) (107/60 - 113/65)  BP(mean): --  RR: 16 (2018 05:26) (16 - 18)  SpO2: 97% (2018 05:26) (97% - 100%)   (if applicable)    General: Alert and oriented x 3. Able to answer question appropriately with no SOB. No acute distress.     HEENT: Normocephalic . No nasal tenderness.  Extraocular muscles are intact. Moist mucosa.     NECK: Supple, no palpable adenopathy.    LUNGS: Clear to auscultation B/L. No wheezing, rales, or rhonchi. No use of accessory muscle.      HEART: S1 S2 Regular rate and no click/ rub.     ABDOMEN: Soft, nontender, and nondistended.  No hepatosplenomegaly is noted. Active bowel sounds.     EXTREMITIES: Without any cyanosis, clubbing, rash, lesions or edema.    NEUROLOGIC: Awake, alert, oriented.     SKIN: Warm and moist. Non diaphoretic.     LABS:                        15.3   3.9   )-----------( 212      ( 2018 11:37 )             46.5     07-06    140  |  106  |  9   ----------------------------<  124<H>  3.9   |  27  |  0.96    Ca    8.6      2018 11:37    TPro  7.0  /  Alb  3.2<L>  /  TBili  0.5  /  DBili  x   /  AST  62<H>  /  ALT  80<H>  /  AlkPhos  72  -      Urinalysis Basic - ( 2018 14:17 )    Color: Yellow / Appearance: Clear / S.010 / pH: x  Gluc: x / Ketone: Negative  / Bili: Negative / Urobili: Negative   Blood: x / Protein: Negative / Nitrite: Negative   Leuk Esterase: Negative / RBC: 0-2 /HPF / WBC 0-2 /HPF   Sq Epi: x / Non Sq Epi: x / Bacteria: Trace /HPF        CARDIAC MARKERS ( 2018 05:43 )  x     / x     / 841 U/L / x     / x      CARDIAC MARKERS ( 2018 22:11 )  x     / x     / 1100 U/L / x     / x        Procalcitonin, Serum: 0.06 ng/mL (18 @ 00:49)      RADIOLOGY & ADDITIONAL STUDIES:  EKG:   Ct chest: < from: CT Chest w/ IV Cont (18 @ 09:07) >    EXAM:  CT CHEST IC                            PROCEDURE DATE:  2018          INTERPRETATION:  Reason for Exam: Crackles    CT of the chest was performed from the thoracic inlet to the level of the   adrenal glands following IV contrast injection of  95 cc of Omnipaque   350. No immediate complications were reported.      Comparison: Chest x-ray of same date    Tubes/Lines: None    Mediastinum and Heart: No lymphadenopathy. Aorta and pulmonary arteries   are normal in size. There is no pericardial effusion. Thyroid gland is   unremarkable.    Lungs, Pleura, and Airways: A pleural-based 4 mm nodule seen on series 3,   image 69 on the right. Minimal left lower lobe peripheral groundglass   opacity noted. Patchy groundglass opacities noted at the lung bases.   Linear scarring noted at the lung bases bilaterally as well.    Visualized Abdomen: Unremarkable    Bones and soft tissues: Unremarkable    IMPRESSION:    Patchy bilateral groundglass opacities, most predominant at the lung   bases which may reflect infection in the right clinical setting.   Follow-up chest CT recommended in 1-3 months to ensure resolution     PAST MEDICAL & SURGICAL HISTORY:  No pertinent past medical history  No significant past surgical history     Impression; 42 Y/O Male from Home. Presented with fever and chills associated with body ache, headache, joint pain, nausea, diaphoresis . Has dry non productive cough. Just got back from [place] and claimed that he had PPD test couple of years back and result is negative. Sleeps 1 pillow and had sick contact when he was in [place]. He verbalized that he felt sick and did not finish his course of antibiotic because it made him feel worse. CXR with no acute findings. RVP, Urine Cx, BLD Cx x 2 all negative.  FUO, gastroenteritis ? etiology      Suggestion;    Can have Oxygen supplementation if needed. O2 saturation 98% room air.   Quantiferon TB pending results.   Reinforced importance of compliance to Daily medications.     stool for cultures o/p Time of Visit:  Patient seen and examined.     MEDICATIONS  (STANDING):  dextrose 5% + sodium chloride 0.9%. 1000 milliLiter(s) (75 mL/Hr) IV Continuous <Continuous>      MEDICATIONS  (PRN):  acetaminophen   Tablet 650 milliGRAM(s) Oral every 6 hours PRN For Temp greater than 38 C (100.4 F)  ondansetron Injectable 4 milliGRAM(s) IV Push every 6 hours PRN Nausea and/or Vomiting       Medications up to date at time of exam.    ROS: No fever, chills, SOB, cough, congestion.  PHYSICAL EXAMINATION:  Vital Signs Last 24 Hrs  T(C): 36.9 (2018 05:26), Max: 36.9 (2018 01:11)  T(F): 98.4 (2018 05:26), Max: 98.5 (2018 01:11)  HR: 65 (2018 05:26) (61 - 69)  BP: 107/60 (2018 05:26) (107/60 - 113/65)  BP(mean): --  RR: 16 (2018 05:26) (16 - 18)  SpO2: 97% (2018 05:26) (97% - 100%)   (if applicable)    General: Alert and oriented x 3. Able to answer question appropriately with no SOB. No acute distress.     HEENT: Normocephalic . No nasal tenderness.  Extraocular muscles are intact. Moist mucosa.     NECK: Supple, no palpable adenopathy.    LUNGS: Clear to auscultation B/L. No wheezing, rales, or rhonchi. No use of accessory muscle.      HEART: S1 S2 Regular rate and no click/ rub.     ABDOMEN: Soft, nontender, and nondistended.  No hepatosplenomegaly is noted. Active bowel sounds.     EXTREMITIES: Without any cyanosis, clubbing, rash, lesions or edema.    NEUROLOGIC: Awake, alert, oriented.     SKIN: Warm and moist. Non diaphoretic.     LABS:                        15.3   3.9   )-----------( 212      ( 2018 11:37 )             46.5     07-06    140  |  106  |  9   ----------------------------<  124<H>  3.9   |  27  |  0.96    Ca    8.6      2018 11:37    TPro  7.0  /  Alb  3.2<L>  /  TBili  0.5  /  DBili  x   /  AST  62<H>  /  ALT  80<H>  /  AlkPhos  72  -      Urinalysis Basic - ( 2018 14:17 )    Color: Yellow / Appearance: Clear / S.010 / pH: x  Gluc: x / Ketone: Negative  / Bili: Negative / Urobili: Negative   Blood: x / Protein: Negative / Nitrite: Negative   Leuk Esterase: Negative / RBC: 0-2 /HPF / WBC 0-2 /HPF   Sq Epi: x / Non Sq Epi: x / Bacteria: Trace /HPF        CARDIAC MARKERS ( 2018 05:43 )  x     / x     / 841 U/L / x     / x      CARDIAC MARKERS ( 2018 22:11 )  x     / x     / 1100 U/L / x     / x        Procalcitonin, Serum: 0.06 ng/mL (18 @ 00:49)      RADIOLOGY & ADDITIONAL STUDIES:  EKG:   Ct chest: < from: CT Chest w/ IV Cont (18 @ 09:07) >    EXAM:  CT CHEST IC                            PROCEDURE DATE:  2018          INTERPRETATION:  Reason for Exam: Crackles    CT of the chest was performed from the thoracic inlet to the level of the   adrenal glands following IV contrast injection of  95 cc of Omnipaque   350. No immediate complications were reported.      Comparison: Chest x-ray of same date    Tubes/Lines: None    Mediastinum and Heart: No lymphadenopathy. Aorta and pulmonary arteries   are normal in size. There is no pericardial effusion. Thyroid gland is   unremarkable.    Lungs, Pleura, and Airways: A pleural-based 4 mm nodule seen on series 3,   image 69 on the right. Minimal left lower lobe peripheral groundglass   opacity noted. Patchy groundglass opacities noted at the lung bases.   Linear scarring noted at the lung bases bilaterally as well.    Visualized Abdomen: Unremarkable    Bones and soft tissues: Unremarkable    IMPRESSION:    Patchy bilateral groundglass opacities, most predominant at the lung   bases which may reflect infection in the right clinical setting.   Follow-up chest CT recommended in 1-3 months to ensure resolution     PAST MEDICAL & SURGICAL HISTORY:  No pertinent past medical history  No significant past surgical history     Impression; 44 Y/O Male from Home. Presented with fever and chills associated with body ache, headache, joint pain, nausea, diaphoresis . Has dry non productive cough. Just got back from [place] and claimed that he had PPD test couple of years back and result is negative. Sleeps 1 pillow and had sick contact when he was in [place]. He verbalized that he felt sick and did not finish his course of antibiotic because it made him feel worse. CXR with no acute findings. RVP, Urine Cx, BLD Cx x 2 all negative.  FUO, gastroenteritis ? etiology      Suggestion;    Can have Oxygen supplementation if needed. O2 saturation 98% room air.   Quantiferon TB pending results.   Reinforced importance of compliance to Daily medications.     stool for cultures o/p  Agree with above assessment and plan as transcribed.

## 2018-07-06 NOTE — DISCHARGE NOTE ADULT - PATIENT PORTAL LINK FT
You can access the [facility] Portal, offered by [facility], by registering with the following website: [email or web address]

## 2018-07-06 NOTE — DISCHARGE NOTE ADULT - CARE PLAN
Principal Discharge DX:	Gastroenteritis  Goal:	Prevent relapse  Assessment and plan of treatment:	Your symptoms resolved with iv fluids. Follow up with Dr Allen (for results of Quantiferon) or your PCP as outpatient within a week. Principal Discharge DX:	Gastroenteritis  Goal:	Prevent relapse  Assessment and plan of treatment:	Your symptoms resolved with iv fluids. Follow up with Dr Allen or your PCP as outpatient within a week. Please follow up with PCP/cardiologist for Echo.

## 2018-07-06 NOTE — PROGRESS NOTE ADULT - SUBJECTIVE AND OBJECTIVE BOX
AURELIA BERRY  MR# [id number]  43yMale        Patient is a 43y old  Male who presents with a chief complaint of fever , chills (06 Jul 2018 16:15)      INTERVAL HPI/OVERNIGHT EVENTS:  Patient seen and examined at bedside. Reporting mild improvement    ALLERGIES  penicillin (Hives)      MEDICATIONS  acetaminophen   Tablet 650 milliGRAM(s) Oral every 6 hours PRN For Temp greater than 38 C (100.4 F)  dextrose 5% + sodium chloride 0.9%. 1000 milliLiter(s) IV Continuous <Continuous>  ergocalciferol 50140 Unit(s) Oral every week  levoFLOXacin IVPB      ondansetron Injectable 4 milliGRAM(s) IV Push every 6 hours PRN Nausea and/or Vomiting              REVIEW OF SYSTEMS:  CONSTITUTIONAL: No fever, weight loss, or fatigue  EYES: No eye pain, visual disturbances, or discharge  ENT:  No difficulty hearing, tinnitus, vertigo; No sinus or throat pain  NECK: No pain or stiffness  RESPIRATORY: No cough, wheezing, chills or hemoptysis; No Shortness of Breath  CARDIOVASCULAR: No chest pain, palpitations, passing out, dizziness, or leg swelling  GASTROINTESTINAL: No abdominal or epigastric pain. No nausea, vomiting, or hematemesis; No diarrhea or constipation. No melena or hematochezia.  GENITOURINARY: No dysuria, frequency, hematuria, or incontinence  NEUROLOGICAL: No headaches, memory loss, loss of strength, numbness, or tremors  SKIN: No itching, burning, rashes, or lesions   LYMPH Nodes: No enlarged glands  ENDOCRINE: No heat or cold intolerance; No hair loss  MUSCULOSKELETAL: No joint pain or swelling; No muscle, back, or extremity pain  PSYCHIATRIC: No depression, anxiety, mood swings, or difficulty sleeping  HEME/LYMPH: No easy bruising, or bleeding gums  ALLERGY AND IMMUNOLOGIC: No hives or eczema	    [ ] All others negative	  [ ] Unable to obtain      T(C): 36.9 (07-06-18 @ 14:20), Max: 36.9 (07-06-18 @ 01:11)  T(F): 98.5 (07-06-18 @ 14:20), Max: 98.5 (07-06-18 @ 01:11)  HR: 60 (07-06-18 @ 14:20) (60 - 65)  BP: 120/59 (07-06-18 @ 14:20) (107/60 - 120/59)  RR: 17 (07-06-18 @ 14:20) (16 - 18)  SpO2: 97% (07-06-18 @ 14:20) (97% - 100%)  Wt(kg): --    I&O's Summary        PHYSICAL EXAM:  A X O x  HEAD:  Atraumatic, Normocephalic  EYES: EOMI, PERRLA, conjunctiva and sclera clear  NECK: Supple, No JVD, Normal thyroid  Resp: CTAB, No crackles, wheezing,   CVS: Regular rate and rhythm; No discernable murmurs, rubs, or gallops  ABD: Soft, Nontender, Nondistended; Bowel sounds present  EXTREMITIES:  2+ Peripheral Pulses, No edema  LYMPH: No dicernable lymphadenopathy noted  GENERAL: NAD, well-groomed, well-developed      LABS:                        15.3   3.9   )-----------( 212      ( 06 Jul 2018 11:37 )             46.5     07-06    140  |  106  |  9   ----------------------------<  124<H>  3.9   |  27  |  0.96    Ca    8.6      06 Jul 2018 11:37    TPro  7.0  /  Alb  3.2<L>  /  TBili  0.5  /  DBili  x   /  AST  62<H>  /  ALT  80<H>  /  AlkPhos  72  07-06        CAPILLARY BLOOD GLUCOSE          Troponins:  ProBNP:  Lipid Profile:   HgA1c:  TSH:           RADIOLOGY & ADDITIONAL TESTS:    Imaging Personally Reviewed:  [x ] YES  [ ] NO  *********************************************************************************************************************************************************************  EXAM:  CT CHEST IC                            PROCEDURE DATE:  07/06/2018          INTERPRETATION:  Reason for Exam: Crackles    CT of the chest was performed from the thoracic inlet to the level of the   adrenal glands following IV contrast injection of  95 cc of Omnipaque   350. No immediate complications were reported.      Comparison: Chest x-ray of same date    Tubes/Lines: None    Mediastinum and Heart: No lymphadenopathy. Aorta and pulmonary arteries   are normal in size. There is no pericardial effusion. Thyroid gland is   unremarkable.    Lungs, Pleura, and Airways: A pleural-based 4 mm nodule seen on series 3,   image 69 on the right. Minimal left lower lobe peripheral groundglass   opacity noted. Patchy groundglass opacities noted at the lung bases.   Linear scarring noted at the lung bases bilaterally as well.    Visualized Abdomen: Unremarkable    Bones and soft tissues: Unremarkable    IMPRESSION:    Patchy bilateral groundglass opacities, most predominant at the lung   bases which may reflect infection in the right clinical setting.   Follow-up chest CT recommended in 1-3 months to ensure resolution                MAGUI MURILLO M.D., ATTENDING RADIOLOGIST  This document has been electronically signed. Jul 6 2018  9:39AM        **********************************************************************************************************************************************************************    Consultant(s) Notes Reviewed:  [x ] YES  [ ] NO    Care Discussed with Consultants/Other Providers [ x] YES  [ ] NO          PAST MEDICAL & SURGICAL HISTORY:  No pertinent past medical history  No significant past surgical history        Bacteremia  No pertinent family history in first degree relatives  Handoff  MEWS Score  No pertinent past medical history  No pertinent past medical history  Gastroenteritis  Bacteremia  Elevated LFTs  Need for prophylactic measure  Fever of unknown origin  No significant past surgical history  No significant past surgical history  (W) CHILLS, FEVER, DIARRHEA  Rigors

## 2018-07-06 NOTE — PROGRESS NOTE ADULT - PROBLEM SELECTOR PLAN 1
RVP & acute hepatitis panel negative (Negative)  CT-Patchy bilateral / bibasilar ground-glass opacities  S/p Rocephin and Zithromax - changed IV Levaquin      Bld cx & QuantiFeron TB, EBV serology, mycoplasma IgM titers pending-f/u results  Stool GI PCR panel not collected, requested again-f/u collection and results   ID following, appreciated-Requested repeat CXR b/c on ID resp exam reported crakles CXR ordered urgently

## 2018-07-06 NOTE — DISCHARGE NOTE ADULT - CARE PROVIDER_API CALL
Michel Allen), Medicine  [address] [place] [address]  Phone: [phone]  Fax: [phone]

## 2018-07-06 NOTE — DIETITIAN INITIAL EVALUATION ADULT. - PROBLEM SELECTOR PLAN 1
fever, chills, headache,  body/muscle aches, joints pain, nausea, NBNB vomiting X3, dry cough, diaphoresis, recent travelled to [place]  likely due to viral infection vs bacteria infection vs TB   febrile 101.6, no leukocytosis, UA negative , lactate NL  CXR shows No evidence for focal infiltrate or lobar consolidation  S/P rocephin 1gm x1 and Zithromax  1gm x1 , NS bolus 2.5L in ER  f/u Blood culture x2 set sent  f/u HIV , QuantiFeron TB, RVP, hepatitis panel , Utox , CK levels   f/u EBV serology, stool GI PCR panel and mycoplasma IgM titers   hold ABx for now, will continue to monitor  c/w gentle hydration  ID consulted with Dr. Waters

## 2018-07-06 NOTE — PROGRESS NOTE ADULT - PROBLEM SELECTOR PLAN 2
Remain elevated but trending down- likely 2/2 to PNA / septic like state  Acute hepatitis panel negative   Repeat CMP in AM

## 2018-07-06 NOTE — ED ADULT NURSE REASSESSMENT NOTE - NS ED NURSE REASSESS COMMENT FT1
verbal report given to nurse ybarra.
Patient aware of urine and stool collection. Patient so far has not had a bowel movement and/or urinate.
Patient endorsed by DOUGLAS Wong . Patient is in no acute distress, alert and orientedx3. Pending admission.

## 2018-07-06 NOTE — DISCHARGE NOTE ADULT - PLAN OF CARE
Prevent relapse Your symptoms resolved with iv fluids. Follow up with Dr Allen (for results of Quantiferon) or your PCP as outpatient within a week. Your symptoms resolved with iv fluids. Follow up with Dr Allen or your PCP as outpatient within a week. Please follow up with PCP/cardiologist for Echo.

## 2018-07-06 NOTE — DIETITIAN INITIAL EVALUATION ADULT. - PROBLEM SELECTOR PLAN 2
mild elevated LFTs AST 96, ALT 92  deny drinks alcohol   f/u hepatitis panel , alcohol level and lipid profile   repeat LFT in am

## 2018-07-07 VITALS
OXYGEN SATURATION: 96 % | DIASTOLIC BLOOD PRESSURE: 83 MMHG | RESPIRATION RATE: 16 BRPM | SYSTOLIC BLOOD PRESSURE: 124 MMHG | HEART RATE: 70 BPM | TEMPERATURE: 98 F

## 2018-07-07 LAB
CULTURE RESULTS: SIGNIFICANT CHANGE UP
LEGIONELLA AG UR QL: NEGATIVE — SIGNIFICANT CHANGE UP
M TB TUBERC IFN-G BLD QL: 0.04 IU/ML — SIGNIFICANT CHANGE UP
M TB TUBERC IFN-G BLD QL: 0.11 IU/ML — SIGNIFICANT CHANGE UP
M TB TUBERC IFN-G BLD QL: NEGATIVE — SIGNIFICANT CHANGE UP
MITOGEN IGNF BCKGRD COR BLD-ACNC: >10 IU/ML — SIGNIFICANT CHANGE UP
SPECIMEN SOURCE: SIGNIFICANT CHANGE UP

## 2018-07-07 PROCEDURE — 87040 BLOOD CULTURE FOR BACTERIA: CPT

## 2018-07-07 PROCEDURE — 84145 PROCALCITONIN (PCT): CPT

## 2018-07-07 PROCEDURE — 80061 LIPID PANEL: CPT

## 2018-07-07 PROCEDURE — 80307 DRUG TEST PRSMV CHEM ANLYZR: CPT

## 2018-07-07 PROCEDURE — 96374 THER/PROPH/DIAG INJ IV PUSH: CPT

## 2018-07-07 PROCEDURE — 80053 COMPREHEN METABOLIC PANEL: CPT

## 2018-07-07 PROCEDURE — 83605 ASSAY OF LACTIC ACID: CPT

## 2018-07-07 PROCEDURE — 87486 CHLMYD PNEUM DNA AMP PROBE: CPT

## 2018-07-07 PROCEDURE — 86703 HIV-1/HIV-2 1 RESULT ANTBDY: CPT

## 2018-07-07 PROCEDURE — 99285 EMERGENCY DEPT VISIT HI MDM: CPT | Mod: 25

## 2018-07-07 PROCEDURE — 87449 NOS EACH ORGANISM AG IA: CPT

## 2018-07-07 PROCEDURE — 71046 X-RAY EXAM CHEST 2 VIEWS: CPT

## 2018-07-07 PROCEDURE — 87507 IADNA-DNA/RNA PROBE TQ 12-25: CPT

## 2018-07-07 PROCEDURE — 86665 EPSTEIN-BARR CAPSID VCA: CPT

## 2018-07-07 PROCEDURE — 80074 ACUTE HEPATITIS PANEL: CPT

## 2018-07-07 PROCEDURE — 71260 CT THORAX DX C+: CPT

## 2018-07-07 PROCEDURE — 86664 EPSTEIN-BARR NUCLEAR ANTIGEN: CPT

## 2018-07-07 PROCEDURE — 84443 ASSAY THYROID STIM HORMONE: CPT

## 2018-07-07 PROCEDURE — 87633 RESP VIRUS 12-25 TARGETS: CPT

## 2018-07-07 PROCEDURE — 83036 HEMOGLOBIN GLYCOSYLATED A1C: CPT

## 2018-07-07 PROCEDURE — 86738 MYCOPLASMA ANTIBODY: CPT

## 2018-07-07 PROCEDURE — 82248 BILIRUBIN DIRECT: CPT

## 2018-07-07 PROCEDURE — 96375 TX/PRO/DX INJ NEW DRUG ADDON: CPT

## 2018-07-07 PROCEDURE — 87086 URINE CULTURE/COLONY COUNT: CPT

## 2018-07-07 PROCEDURE — 81001 URINALYSIS AUTO W/SCOPE: CPT

## 2018-07-07 PROCEDURE — 82550 ASSAY OF CK (CPK): CPT

## 2018-07-07 PROCEDURE — 93005 ELECTROCARDIOGRAM TRACING: CPT

## 2018-07-07 PROCEDURE — 87581 M.PNEUMON DNA AMP PROBE: CPT

## 2018-07-07 PROCEDURE — 82306 VITAMIN D 25 HYDROXY: CPT

## 2018-07-07 PROCEDURE — 85027 COMPLETE CBC AUTOMATED: CPT

## 2018-07-07 PROCEDURE — 87798 DETECT AGENT NOS DNA AMP: CPT

## 2018-07-07 PROCEDURE — 86663 EPSTEIN-BARR ANTIBODY: CPT

## 2018-07-07 PROCEDURE — 86480 TB TEST CELL IMMUN MEASURE: CPT

## 2018-07-07 RX ADMIN — ERGOCALCIFEROL 50000 UNIT(S): 1.25 CAPSULE ORAL at 11:10

## 2018-07-07 NOTE — PROGRESS NOTE ADULT - ASSESSMENT
Patient is a 42 y/o M from home, , with no significant PMH presents to ED c/o fever and chills. Pt was in [place] last Thursday and got sick on Saturday with vomiting , he was started on a antiemetic and Cipro (took 2 days, not finish course of medication because it made him feel worse.). Pt returned to [place] on Monday. Pt also had associated headache, body/muscle aches, joints pain, nausea, NBNB vomiting X3, dry cough, diaphoresis, some  watery diarrhea, no sore throat, no wt loss. Pt denies any alcohol use, drug abuse, never smoker, no sick contact. Pt was evaluated by [facility]  and sent to ED for further evaluation, Pt denies chest pain, SOB, abd pain, or any other complains.  Allergic to penicillin (hives), pt not on any meds at home.    ER course, temp 101.6, HR 66, /83, improved to /60 w/o intervention. RR 18, SO2 97%. Labs wnl except LFT mild elevated , AST 96, ALT 92, CXR shows No evidence for focal infiltrate or lobar consolidation. UA negative. S/P rocephin 1gm x1 and Zithromax 1gm x1 , NS bolus 2.5L in ER. (04 Jul 2018 20:45)    # gastroenteritis viral .rvp neg . low pct . gi pcr neg . wbc low with shift to right     # ct chest with ground glass opacities ? viral pneumonitis . ebv serology indicative of old infection . low pct unlikely bacterial     plan   blood cx times 2 f/u ( neg )   d/c levaquin   SERUM MYCOPLASMA IGM   HOLD OFF AB FOR NOW   may need echo to check LVEF which can be done as outpt     stable for d/c from id pt of view   D/W INTERN

## 2018-07-07 NOTE — PROGRESS NOTE ADULT - SUBJECTIVE AND OBJECTIVE BOX
Subjective: feels back to normal. no cough no sob . no fevers . no muscle pain       PHYSICAL EXAM:    Vital Signs Last 24 Hrs  T(C): 36.6 (07 Jul 2018 14:22), Max: 37 (06 Jul 2018 20:41)  T(F): 97.9 (07 Jul 2018 14:22), Max: 98.6 (06 Jul 2018 20:41)  HR: 70 (07 Jul 2018 14:22) (63 - 70)  BP: 124/83 (07 Jul 2018 14:22) (103/56 - 124/83)  BP(mean): --  RR: 16 (07 Jul 2018 14:22) (16 - 17)  SpO2: 96% (07 Jul 2018 14:22) (96% - 100%)    Constitutional: awake alert oriented times 3   BRENTON SCLERA anicteric EOMI   LUNGS bilateral rales   CVS s1 s2 aud no murmurs   ABDOMEN soft non tender no HSM   NEUROLOGY  no defecits   SKIN no rash   EXTREMITIES no edema no cyanosis       LABS/DIAGNOSTIC TESTS                        15.3   3.9   )-----------( 212      ( 06 Jul 2018 11:37 )             46.5     07-06    140  |  106  |  9   ----------------------------<  124<H>  3.9   |  27  |  0.96    Ca    8.6      06 Jul 2018 11:37    TPro  7.0  /  Alb  3.2<L>  /  TBili  0.5  /  DBili  x   /  AST  62<H>  /  ALT  80<H>  /  AlkPhos  72  07-06    Procalcitonin, Serum (07.06.18 @ 00:49)    Procalcitonin, Serum: 0.06: This assay is intended for use to determine the change of PCT over time  as an aid in assessing the cumulative 28-day risk of all-cause mortality  for patients diagnosed with severe sepsis or septic shock in the ICU, or  when obtained in the emergency department or other medical wards prior to  ICU admission. This assay was performed by the Roche [employer] PCT  assay. ng/mL          meds   acetaminophen   Tablet 650 milliGRAM(s) Oral every 6 hours PRN  dextrose 5% + sodium chloride 0.9%. 1000 milliLiter(s) IV Continuous <Continuous>  ergocalciferol 46749 Unit(s) Oral every week  levoFLOXacin IVPB 500 milliGRAM(s) IV Intermittent every 24 hours  levoFLOXacin IVPB      ondansetron Injectable 4 milliGRAM(s) IV Push every 6 hours PRN        CULTURES  Culture Results:   GI PCR Results: NOT detected  *******Please Note:*******  GI panel PCR evaluates for:  Campylobacter, Plesiomonas shigelloides, Salmonella,  Vibrio, Yersinia enterocolitica, Enteroaggregative  Escherichia coli (EAEC), Enteropathogenic E.coli (EPEC),  Enterotoxigenic E. coli (ETEC) lt/st, Shiga-like  toxin-producing E. coli (STEC) stx1/stx2,  Shigella/ Enteroinvasive E. coli (EIEC), Cryptosporidium,  Cyclospora cayetanensis, Entamoeba histolytica,  Giardia lamblia, Adenovirus F 40/41, Astrovirus,  Norovirus GI/GII, Rotavirus A, Sapovirus (07-07 @ 00:49)  Culture Results: urine   No growth (07-05 @ 00:09)  Culture Results: blood  No growth to date. (07-04 @ 23:30)  Culture Results: blood  No growth to date. (07-04 @ 23:30)    Rapid Respiratory Viral Panel (07.04.18 @ 23:37)    Rapid RVP Result: NotDete: The FilmArray RVP Rapid uses polymerase chain reaction (PCR) and melt  curve analysis to screen for adenovirus; coronavirus HKU1, NL63, 229E,  OC43; human metapneumovirus (hMPV); human enterovirus/rhinovirus  (Entero/RV); influenza A; influenza A/H1;influenza A/H3; influenza  A/H1-2009; influenza B; parainfluenza viruses 1, 2, 3, 4; respiratory  syncytial virus; Bordetella pertussis; Mycoplasma pneumoniae; and  Chlamydophila pneumoniae.    Satya-Barr Virus Serologic Test (07.05.18 @ 10:01)    EBV EA Ab EIA: <5.0 U/mL    EBV VCA IgG EIA: 47.6 U/mL    EBV VCA IgM EIA: <10.0 U/mL    EBV Interpretation: See Note: INTERPRETATION OF SATYA BARR VIRUS (EBV) ANTIBODY RESULTS  EBV VCA IGG AB EBV NA IGG AB EBV VCA IGM AB EBV EA IGG AB Diagnosis  NEG NEG NEG NEG EBV Sero-negative  NEG NEG POS NEG Suspected primary infection (Early Phase)  POS NEG POS POS/NEG Past EBV infection ( Convalescence)  POS POS NEG POS/NEG Past EBV infection  POS POS POS/NEG POS Reactivated Infection    Rapid HIV-1/2 Antibody (07.04.18 @ 23:37)    Rapid HIV-1/2 Antibody: Nonreact: The sensitivity and specificity of the assay are >99%.  A nonreactive result with the OraQuick Advance HIV 1/2 test ([facility]) does not preclude previous exposure or infection with  HIV 1/2. Reactive Rapid HIV screen results are confirmed by 4th  generation CMIA and Western Blot Assay. [place] State Law prohibits  redisclosure of this result to any unauthorized party.            RADIOLOGY    < from: CT Chest w/ IV Cont (07.06.18 @ 09:07) >  EXAM:  CT CHEST IC                            PROCEDURE DATE:  07/06/2018          INTERPRETATION:  Reason for Exam: Crackles    CT of the chest was performed from the thoracic inlet to the level of the   adrenal glands following IV contrast injection of  95 cc of Omnipaque   350. No immediate complications were reported.      Comparison: Chest x-ray of same date    Tubes/Lines: None    Mediastinum and Heart: No lymphadenopathy. Aorta and pulmonary arteries   are normal in size. There is no pericardial effusion. Thyroid gland is   unremarkable.    Lungs, Pleura, and Airways: A pleural-based 4 mm nodule seen on series 3,   image 69 on the right. Minimal left lower lobe peripheral groundglass   opacity noted. Patchy groundglass opacities noted at the lung bases.   Linear scarring noted at the lung bases bilaterally as well.    Visualized Abdomen: Unremarkable    Bones and soft tissues: Unremarkable    IMPRESSION:    Patchy bilateral groundglass opacities, most predominant at the lung   bases which may reflect infection in the right clinical setting.   Follow-up chest CT recommended in 1-3 months to ensure resolution                MAGUI MURILLO M.D., ATTENDING RADIOLOGIST  This document has been electronically signed. Jul 6 2018  9:39AM    < end of copied text >

## 2018-07-10 LAB
CULTURE RESULTS: SIGNIFICANT CHANGE UP
CULTURE RESULTS: SIGNIFICANT CHANGE UP
M PNEUMO IGM SER-ACNC: 94 UNITS/ML — SIGNIFICANT CHANGE UP
MYCOPLASMA AG SPEC QL: NEGATIVE — SIGNIFICANT CHANGE UP
SPECIMEN SOURCE: SIGNIFICANT CHANGE UP
SPECIMEN SOURCE: SIGNIFICANT CHANGE UP

## 2018-08-28 NOTE — H&P ADULT - CLICK TO LAUNCH ORM
Last 5 Patient Entered Readings                                      Current 30 Day Average: 136/83     Recent Readings 8/26/2018 8/23/2018 8/21/2018 8/20/2018 8/14/2018    SBP (mmHg) 123 142 125 127 138    DBP (mmHg) 78 85 78 75 79    Pulse 70 88 85 82 86      Patient's BP average is above goal of <130/80.     Patient denies s/s of hypotension (lightheadedness, dizziness, nausea, fatigue) associated with low readings. Instructed patient to inform me if this occurs, patient confirms understanding.      Patient denies s/s of hypertension (SOB, CP, severe headaches, changes in vision) associated with high readings. Instructed patient to go to the ED if BP > 180/110 and accompanied by hypertensive s/s, patient confirms understanding.    Will continue to monitor regularly. Will follow up in 2-3 weeks, sooner if BP begins to trend upward or downward.    Patient has my contact information and knows to call with any concerns or clinical changes.     Current HTN regimen:    Hypertension Medications             amLODIPine (NORVASC) 10 MG tablet Take 1 tablet (10 mg total) by mouth once daily.    carvedilol (COREG) 6.25 MG tablet Take 1 tablet (6.25 mg total) by mouth 2 (two) times daily with meals.    chlorthalidone (HYGROTEN) 25 MG Tab TAKE 1 TABLET BY MOUTH ONCE DAILY    valsartan (DIOVAN) 320 MG tablet TAKE 1 TABLET (320 MG TOTAL) BY MOUTH ONCE DAILY.      Patient's blood pressure is closer to goal but not quite there. He will continue his current medications at this time.        .

## 2020-01-11 NOTE — ED PROVIDER NOTE - TEMPLATE, MLM
VOICEMAIL  1. Caller Name: Malena Bennett                      Call Back Number: [phone] (home)     2. Message: needs her labs reordered because the ones that Dr. Kelley had ordered while Dr. Lora was out fo town where ordered wrong. She gave us a copy of the labs that she needed and they were still wrong.     3. Patient approves office to leave a detailed voicemail/[facility] message: yes    Phone Number Called: [phone] (home)     Call outcome: spoke to patient regarding message below    Message: She will send another copy of the form that she had given us previously through [facility] and we can reprocess this next week. No rush, per Pt, but she should like to get them done before she goes back to school at the end of the month.     
Communicable/Infectious

## 2021-03-21 ENCOUNTER — TRANSCRIPTION ENCOUNTER (OUTPATIENT)
Age: 46
End: 2021-03-21

## 2021-03-21 ENCOUNTER — EMERGENCY (EMERGENCY)
Facility: HOSPITAL | Age: 46
LOS: 1 days | Discharge: HOME | End: 2021-03-21
Attending: EMERGENCY MEDICINE | Admitting: EMERGENCY MEDICINE
Payer: COMMERCIAL

## 2021-03-21 VITALS
OXYGEN SATURATION: 99 % | HEART RATE: 86 BPM | HEIGHT: 62 IN | RESPIRATION RATE: 18 BRPM | SYSTOLIC BLOOD PRESSURE: 139 MMHG | DIASTOLIC BLOOD PRESSURE: 86 MMHG | TEMPERATURE: 98 F

## 2021-03-21 LAB
ANION GAP SERPL CALC-SCNC: 12 MMOL/L — SIGNIFICANT CHANGE UP (ref 7–14)
APPEARANCE UR: CLEAR — SIGNIFICANT CHANGE UP
BASOPHILS # BLD AUTO: 0.02 K/UL — SIGNIFICANT CHANGE UP (ref 0–0.2)
BASOPHILS NFR BLD AUTO: 0.2 % — SIGNIFICANT CHANGE UP (ref 0–2)
BILIRUB UR-MCNC: NEGATIVE — SIGNIFICANT CHANGE UP
BUN SERPL-MCNC: 17 MG/DL — SIGNIFICANT CHANGE UP (ref 7–23)
CALCIUM SERPL-MCNC: 9 MG/DL — SIGNIFICANT CHANGE UP (ref 8.4–10.5)
CHLORIDE SERPL-SCNC: 102 MMOL/L — SIGNIFICANT CHANGE UP (ref 98–107)
CO2 SERPL-SCNC: 23 MMOL/L — SIGNIFICANT CHANGE UP (ref 22–31)
COLOR SPEC: COLORLESS — SIGNIFICANT CHANGE UP
CREAT SERPL-MCNC: 1.35 MG/DL — HIGH (ref 0.5–1.3)
DIFF PNL FLD: ABNORMAL
EOSINOPHIL # BLD AUTO: 0.06 K/UL — SIGNIFICANT CHANGE UP (ref 0–0.5)
EOSINOPHIL NFR BLD AUTO: 0.6 % — SIGNIFICANT CHANGE UP (ref 0–6)
EPI CELLS # UR: SIGNIFICANT CHANGE UP
GLUCOSE SERPL-MCNC: 96 MG/DL — SIGNIFICANT CHANGE UP (ref 70–99)
GLUCOSE UR QL: NEGATIVE — SIGNIFICANT CHANGE UP
HCT VFR BLD CALC: 47.4 % — SIGNIFICANT CHANGE UP (ref 39–50)
HGB BLD-MCNC: 15.9 G/DL — SIGNIFICANT CHANGE UP (ref 13–17)
IANC: 7.36 K/UL — SIGNIFICANT CHANGE UP (ref 1.5–8.5)
IMM GRANULOCYTES NFR BLD AUTO: 0.4 % — SIGNIFICANT CHANGE UP (ref 0–1.5)
KETONES UR-MCNC: NEGATIVE — SIGNIFICANT CHANGE UP
LEUKOCYTE ESTERASE UR-ACNC: NEGATIVE — SIGNIFICANT CHANGE UP
LYMPHOCYTES # BLD AUTO: 1.82 K/UL — SIGNIFICANT CHANGE UP (ref 1–3.3)
LYMPHOCYTES # BLD AUTO: 17.8 % — SIGNIFICANT CHANGE UP (ref 13–44)
MCHC RBC-ENTMCNC: 29.9 PG — SIGNIFICANT CHANGE UP (ref 27–34)
MCHC RBC-ENTMCNC: 33.5 GM/DL — SIGNIFICANT CHANGE UP (ref 32–36)
MCV RBC AUTO: 89.3 FL — SIGNIFICANT CHANGE UP (ref 80–100)
MONOCYTES # BLD AUTO: 0.9 K/UL — SIGNIFICANT CHANGE UP (ref 0–0.9)
MONOCYTES NFR BLD AUTO: 8.8 % — SIGNIFICANT CHANGE UP (ref 2–14)
NEUTROPHILS # BLD AUTO: 7.36 K/UL — SIGNIFICANT CHANGE UP (ref 1.8–7.4)
NEUTROPHILS NFR BLD AUTO: 72.2 % — SIGNIFICANT CHANGE UP (ref 43–77)
NITRITE UR-MCNC: NEGATIVE — SIGNIFICANT CHANGE UP
NRBC # BLD: 0 /100 WBCS — SIGNIFICANT CHANGE UP
NRBC # FLD: 0 K/UL — SIGNIFICANT CHANGE UP
PH UR: 6.5 — SIGNIFICANT CHANGE UP (ref 5–8)
PLATELET # BLD AUTO: 286 K/UL — SIGNIFICANT CHANGE UP (ref 150–400)
POTASSIUM SERPL-MCNC: 3.9 MMOL/L — SIGNIFICANT CHANGE UP (ref 3.5–5.3)
POTASSIUM SERPL-SCNC: 3.9 MMOL/L — SIGNIFICANT CHANGE UP (ref 3.5–5.3)
PROT UR-MCNC: NEGATIVE — SIGNIFICANT CHANGE UP
RBC # BLD: 5.31 M/UL — SIGNIFICANT CHANGE UP (ref 4.2–5.8)
RBC # FLD: 12.3 % — SIGNIFICANT CHANGE UP (ref 10.3–14.5)
RBC CASTS # UR COMP ASSIST: <5 /HPF — SIGNIFICANT CHANGE UP (ref 0–4)
SODIUM SERPL-SCNC: 137 MMOL/L — SIGNIFICANT CHANGE UP (ref 135–145)
SP GR SPEC: 1.01 — LOW (ref 1.01–1.02)
UROBILINOGEN FLD QL: SIGNIFICANT CHANGE UP
WBC # BLD: 10.2 K/UL — SIGNIFICANT CHANGE UP (ref 3.8–10.5)
WBC # FLD AUTO: 10.2 K/UL — SIGNIFICANT CHANGE UP (ref 3.8–10.5)
WBC UR QL: <5 /HPF — SIGNIFICANT CHANGE UP (ref 0–5)

## 2021-03-21 PROCEDURE — 76770 US EXAM ABDO BACK WALL COMP: CPT | Mod: 26

## 2021-03-21 PROCEDURE — 99285 EMERGENCY DEPT VISIT HI MDM: CPT

## 2021-03-21 RX ORDER — KETOROLAC TROMETHAMINE 30 MG/ML
15 SYRINGE (ML) INJECTION ONCE
Refills: 0 | Status: DISCONTINUED | OUTPATIENT
Start: 2021-03-21 | End: 2021-03-21

## 2021-03-21 RX ORDER — SODIUM CHLORIDE 9 MG/ML
1000 INJECTION INTRAMUSCULAR; INTRAVENOUS; SUBCUTANEOUS ONCE
Refills: 0 | Status: COMPLETED | OUTPATIENT
Start: 2021-03-21 | End: 2021-03-21

## 2021-03-21 RX ADMIN — Medication 15 MILLIGRAM(S): at 16:38

## 2021-03-21 RX ADMIN — SODIUM CHLORIDE 1000 MILLILITER(S): 9 INJECTION INTRAMUSCULAR; INTRAVENOUS; SUBCUTANEOUS at 16:38

## 2021-03-21 NOTE — ED PROVIDER NOTE - CLINICAL SUMMARY MEDICAL DECISION MAKING FREE TEXT BOX
Onur, PGY2 - 46M PMH nephrolithiasis (found incidentally on CT 2 years ago), nonsmoker p/w intermittent L flank pain radiating to LUQ/L groin x hours. VSS, non-toxic appearing. Suspect renal colic, low suspicion for infectious or vascular etiology. Plan: screening labs, US kidney/bladder, pain control, reeval

## 2021-03-21 NOTE — ED ADULT TRIAGE NOTE - CHIEF COMPLAINT QUOTE
Pt c/o L Flank pain, LUQ abdominal pain radiating down to LLQ in abdomen with nausea, no vomiting. Reports onset of pain yesterday - escalated throughout the day.  Was seen at [facility] today +microscopic blood in urine, and referred to ED if pain persists.  Patient denies dysuria.

## 2021-03-21 NOTE — ED PROVIDER NOTE - NS ED ROS FT
General: Denies fevers  Eyes: Denies vision changes  ENMT: Denies sore throat  CV: Denies chest pain  Resp: Denies SOB  GI: Pain from L flank radiating to LUQ, mild nausea. No vomiting, diarrhea  : +Microscopic hematuria, pain radiating to groin. Denies painful urination, gross hematuria  Skin: Denies new rashes  Neuro: Denies headache, focal weakness  MSK: Denies recent trauma

## 2021-03-21 NOTE — ED PROVIDER NOTE - PATIENT PORTAL LINK FT
You can access the FollowMyHealth Patient Portal offered by [facility] by registering at the following website: [email or web address]. By joining [employer]’s FollowMyHealth portal, you will also be able to view your health information using other applications (apps) compatible with our system.

## 2021-03-21 NOTE — ED PROVIDER NOTE - NSFOLLOWUPINSTRUCTIONS_ED_ALL_ED_FT
Follow up with your primary care doctor within 48 hours.     Consider following up with a urologist.    Renal Colic    WHAT YOU NEED TO KNOW:    Renal colic is severe pain in your lower back or sides. The pain is usually on one side, but may be on both sides of your lower back. Renal colic may start quickly, come and go, and become worse over time. Renal colic is caused by a blockage in your urinary tract. The most common cause of a blockage is a kidney stone. Blood clots, ureter spasms, and dead tissue may also block your urinary tract.    Female Urinary System                DISCHARGE INSTRUCTIONS:    Return to the emergency department if:   •You cannot stop vomiting.      •You see new or increased bleeding when you urinate.      •You are urinating less than usual, or not at all.      •Your pain is not getting better even after treatment.      Call your doctor if:   •You have fever.      •You need to urinate more often than usual, or right away.      •You see a stone in your urine strainer after you urinate.      •You have questions or concerns about your condition or care.      Medicines:   •Medicines may help decrease pain and muscle spasms. You may also need medicine to calm your stomach and stop vomiting.      •Take your medicine as directed. Contact your healthcare provider if you think your medicine is not helping or if you have side effects. Tell him of her if you are allergic to any medicine. Keep a list of the medicines, vitamins, and herbs you take. Include the amounts, and when and why you take them. Bring the list or the pill bottles to follow-up visits. Carry your medicine list with you in case of an emergency.      Manage your symptoms:   •Drink liquids as directed. This will help decrease pain and flush blockages from your urinary tract. Ask how much liquid to drink each day and which liquids are best for you. You may need to drink about 3 liters (12 glasses) of liquids each day. Half of your total daily liquids should be water. Limit coffee, tea, and soda to 2 cups daily. Your urine should be pale and clear.      •Strain your urine every time you urinate. Urinate into a strainer (funnel with a fine mesh on the bottom) or glass jar to collect kidney stones. Give the kidney stones to your healthcare provider at your next visit.  Look for Stones in the Filter           •Eat a variety of healthy foods. Healthy foods include fruits, vegetables, whole-grain breads, low-fat dairy products, beans, lean meats, and fish. You may need to increase the amount of citrus fruit you eat, such as oranges. Ask your healthcare provider how much salt, calcium, and protein you should eat.  Healthy Foods           •Avoid activity in hot temperatures. Heat may cause you to become dehydrated and urinate less.      Follow up with your doctor as directed: You may need to return for tests to check if your blockage has cleared. Write down your questions so you remember to ask them during your visits. Follow up with your primary care doctor within 48 hours.     Consider following up with a urologist this week.    Take Ibuprofen as needed for pain, as instructed on packaging. Do not take more than 1200 mg within a 24 hour period. Take this medication with food.     Renal Colic    WHAT YOU NEED TO KNOW:    Renal colic is severe pain in your lower back or sides. The pain is usually on one side, but may be on both sides of your lower back. Renal colic may start quickly, come and go, and become worse over time. Renal colic is caused by a blockage in your urinary tract. The most common cause of a blockage is a kidney stone. Blood clots, ureter spasms, and dead tissue may also block your urinary tract.    Female Urinary System                DISCHARGE INSTRUCTIONS:    Return to the emergency department if:   •You cannot stop vomiting.      •You see new or increased bleeding when you urinate.      •You are urinating less than usual, or not at all.      •Your pain is not getting better even after treatment.      Call your doctor if:   •You have fever.      •You need to urinate more often than usual, or right away.      •You see a stone in your urine strainer after you urinate.      •You have questions or concerns about your condition or care.      Medicines:   •Medicines may help decrease pain and muscle spasms. You may also need medicine to calm your stomach and stop vomiting.      •Take your medicine as directed. Contact your healthcare provider if you think your medicine is not helping or if you have side effects. Tell him of her if you are allergic to any medicine. Keep a list of the medicines, vitamins, and herbs you take. Include the amounts, and when and why you take them. Bring the list or the pill bottles to follow-up visits. Carry your medicine list with you in case of an emergency.      Manage your symptoms:   •Drink liquids as directed. This will help decrease pain and flush blockages from your urinary tract. Ask how much liquid to drink each day and which liquids are best for you. You may need to drink about 3 liters (12 glasses) of liquids each day. Half of your total daily liquids should be water. Limit coffee, tea, and soda to 2 cups daily. Your urine should be pale and clear.      •Strain your urine every time you urinate. Urinate into a strainer (funnel with a fine mesh on the bottom) or glass jar to collect kidney stones. Give the kidney stones to your healthcare provider at your next visit.  Look for Stones in the Filter           •Eat a variety of healthy foods. Healthy foods include fruits, vegetables, whole-grain breads, low-fat dairy products, beans, lean meats, and fish. You may need to increase the amount of citrus fruit you eat, such as oranges. Ask your healthcare provider how much salt, calcium, and protein you should eat.  Healthy Foods           •Avoid activity in hot temperatures. Heat may cause you to become dehydrated and urinate less.      Follow up with your doctor as directed: You may need to return for tests to check if your blockage has cleared. Write down your questions so you remember to ask them during your visits.

## 2021-03-21 NOTE — ED PROVIDER NOTE - PROGRESS NOTE DETAILS
Onur, PGY2 – Pt was re-evaluated at bedside, feeling better overall. US without e/o hydronephrosis, ?nonobstructing calculus on R side, mild increase in Cr (no ANANYA). Results were discussed with patient. Pending UA results, anticipate DC with uro f/u Onur, PGY2 – UA with hematuria, no e/o infection. Results were discussed with patient as well as return precautions and follow up plan with PCP and/or specialist. Time was taken to answer any questions that the patient had before providing them with discharge paperwork.

## 2021-03-21 NOTE — ED PROVIDER NOTE - PHYSICAL EXAMINATION
I have reviewed the triage vital signs.  Const: AAOx3, in NAD  Eyes: no conjunctival injection  HENT: NCAT, Neck supple, oral mucosa moist  CV: RRR, +S1, S2  Resp: CTAB, no respiratory distress  GI: Abdomen soft, NTND, no guarding, L CVA tenderness  Extremities: No peripheral edema,  2+ radial and DP pulses  Skin: Warm, well perfused, no rash  MSK: No gross deformities appreciated  Neuro: No focal sensory or motor deficits  Psych: Appropriate mood and affect

## 2021-03-21 NOTE — ED PROVIDER NOTE - OBJECTIVE STATEMENT
46M PMH nephrolithiasis p/w atraumatic L flank pain x hours. Pt reports L flank pain radiating to LUQ/L groin today. Had brief episodes yesterday as well. Described as intermittent. A/w mild nausea. No fevers, vomiting, dysuria, gross hematuria, diarrhea, constipation. No testicular pain/swelling, abnormal discharge. Was evaluated at  and was found to have microscopic hematuria.  recommended possible imaging tomorrow if pain persisted. Pt presents to ED for persistent pain. Of note, pt was found to have L-sided kidney stones incidentally on CT 2 years ago, but pt states he did not have related sx.

## 2021-03-21 NOTE — ED PROVIDER NOTE - ATTENDING CONTRIBUTION TO CARE
Pt with ceferino ABBOTT kidney stone, no hydro on US, urine with blood but no e/o infection, pain improve after toradol, will d/c with return precautions and uro f/u

## 2021-03-23 ENCOUNTER — INPATIENT (INPATIENT)
Facility: HOSPITAL | Age: 46
LOS: 1 days | Discharge: HOME | End: 2021-03-25
Attending: UROLOGY | Admitting: UROLOGY
Payer: COMMERCIAL

## 2021-03-23 ENCOUNTER — APPOINTMENT (OUTPATIENT)
Dept: UROLOGY | Facility: HOSPITAL | Age: 46
End: 2021-03-23

## 2021-03-23 ENCOUNTER — TRANSCRIPTION ENCOUNTER (OUTPATIENT)
Age: 46
End: 2021-03-23

## 2021-03-23 VITALS
DIASTOLIC BLOOD PRESSURE: 91 MMHG | HEART RATE: 101 BPM | TEMPERATURE: 97 F | HEIGHT: 62 IN | RESPIRATION RATE: 17 BRPM | SYSTOLIC BLOOD PRESSURE: 160 MMHG | OXYGEN SATURATION: 100 %

## 2021-03-23 LAB
ALBUMIN SERPL ELPH-MCNC: 3.5 G/DL — SIGNIFICANT CHANGE UP (ref 3.3–5)
ALBUMIN SERPL ELPH-MCNC: 4.1 G/DL — SIGNIFICANT CHANGE UP (ref 3.3–5)
ALP SERPL-CCNC: 84 U/L — SIGNIFICANT CHANGE UP (ref 40–120)
ALP SERPL-CCNC: 99 U/L — SIGNIFICANT CHANGE UP (ref 40–120)
ALT FLD-CCNC: 22 U/L — SIGNIFICANT CHANGE UP (ref 4–41)
ALT FLD-CCNC: 26 U/L — SIGNIFICANT CHANGE UP (ref 4–41)
ANION GAP SERPL CALC-SCNC: 11 MMOL/L — SIGNIFICANT CHANGE UP (ref 7–14)
ANION GAP SERPL CALC-SCNC: 15 MMOL/L — HIGH (ref 7–14)
ANION GAP SERPL CALC-SCNC: 9 MMOL/L — SIGNIFICANT CHANGE UP (ref 7–14)
APPEARANCE UR: CLEAR — SIGNIFICANT CHANGE UP
AST SERPL-CCNC: 25 U/L — SIGNIFICANT CHANGE UP (ref 4–40)
AST SERPL-CCNC: 29 U/L — SIGNIFICANT CHANGE UP (ref 4–40)
B PERT DNA SPEC QL NAA+PROBE: SIGNIFICANT CHANGE UP
BASOPHILS # BLD AUTO: 0.03 K/UL — SIGNIFICANT CHANGE UP (ref 0–0.2)
BASOPHILS NFR BLD AUTO: 0.3 % — SIGNIFICANT CHANGE UP (ref 0–2)
BILIRUB SERPL-MCNC: 0.6 MG/DL — SIGNIFICANT CHANGE UP (ref 0.2–1.2)
BILIRUB SERPL-MCNC: 0.8 MG/DL — SIGNIFICANT CHANGE UP (ref 0.2–1.2)
BILIRUB UR-MCNC: NEGATIVE — SIGNIFICANT CHANGE UP
BLOOD GAS VENOUS COMPREHENSIVE RESULT: SIGNIFICANT CHANGE UP
BLOOD GAS VENOUS COMPREHENSIVE RESULT: SIGNIFICANT CHANGE UP
BUN SERPL-MCNC: 14 MG/DL — SIGNIFICANT CHANGE UP (ref 7–23)
BUN SERPL-MCNC: 15 MG/DL — SIGNIFICANT CHANGE UP (ref 7–23)
BUN SERPL-MCNC: 17 MG/DL — SIGNIFICANT CHANGE UP (ref 7–23)
C PNEUM DNA SPEC QL NAA+PROBE: SIGNIFICANT CHANGE UP
CALCIUM SERPL-MCNC: 8.5 MG/DL — SIGNIFICANT CHANGE UP (ref 8.4–10.5)
CALCIUM SERPL-MCNC: 9 MG/DL — SIGNIFICANT CHANGE UP (ref 8.4–10.5)
CALCIUM SERPL-MCNC: 9.3 MG/DL — SIGNIFICANT CHANGE UP (ref 8.4–10.5)
CHLORIDE SERPL-SCNC: 102 MMOL/L — SIGNIFICANT CHANGE UP (ref 98–107)
CHLORIDE SERPL-SCNC: 104 MMOL/L — SIGNIFICANT CHANGE UP (ref 98–107)
CHLORIDE SERPL-SCNC: 107 MMOL/L — SIGNIFICANT CHANGE UP (ref 98–107)
CO2 SERPL-SCNC: 20 MMOL/L — LOW (ref 22–31)
CO2 SERPL-SCNC: 22 MMOL/L — SIGNIFICANT CHANGE UP (ref 22–31)
CO2 SERPL-SCNC: 24 MMOL/L — SIGNIFICANT CHANGE UP (ref 22–31)
COLOR SPEC: COLORLESS — SIGNIFICANT CHANGE UP
CREAT SERPL-MCNC: 1.71 MG/DL — HIGH (ref 0.5–1.3)
CREAT SERPL-MCNC: 1.8 MG/DL — HIGH (ref 0.5–1.3)
CREAT SERPL-MCNC: 1.81 MG/DL — HIGH (ref 0.5–1.3)
DIFF PNL FLD: ABNORMAL
EOSINOPHIL # BLD AUTO: 0.09 K/UL — SIGNIFICANT CHANGE UP (ref 0–0.5)
EOSINOPHIL NFR BLD AUTO: 0.8 % — SIGNIFICANT CHANGE UP (ref 0–6)
FLUAV SUBTYP SPEC NAA+PROBE: SIGNIFICANT CHANGE UP
FLUBV RNA SPEC QL NAA+PROBE: SIGNIFICANT CHANGE UP
GLUCOSE SERPL-MCNC: 104 MG/DL — HIGH (ref 70–99)
GLUCOSE SERPL-MCNC: 110 MG/DL — HIGH (ref 70–99)
GLUCOSE SERPL-MCNC: 97 MG/DL — SIGNIFICANT CHANGE UP (ref 70–99)
GLUCOSE UR QL: NEGATIVE — SIGNIFICANT CHANGE UP
HADV DNA SPEC QL NAA+PROBE: SIGNIFICANT CHANGE UP
HCOV 229E RNA SPEC QL NAA+PROBE: SIGNIFICANT CHANGE UP
HCOV HKU1 RNA SPEC QL NAA+PROBE: SIGNIFICANT CHANGE UP
HCOV NL63 RNA SPEC QL NAA+PROBE: SIGNIFICANT CHANGE UP
HCOV OC43 RNA SPEC QL NAA+PROBE: SIGNIFICANT CHANGE UP
HCT VFR BLD CALC: 46.5 % — SIGNIFICANT CHANGE UP (ref 39–50)
HGB BLD-MCNC: 15.2 G/DL — SIGNIFICANT CHANGE UP (ref 13–17)
HMPV RNA SPEC QL NAA+PROBE: SIGNIFICANT CHANGE UP
HPIV1 RNA SPEC QL NAA+PROBE: SIGNIFICANT CHANGE UP
HPIV2 RNA SPEC QL NAA+PROBE: SIGNIFICANT CHANGE UP
HPIV3 RNA SPEC QL NAA+PROBE: SIGNIFICANT CHANGE UP
HPIV4 RNA SPEC QL NAA+PROBE: SIGNIFICANT CHANGE UP
IANC: 8.35 K/UL — SIGNIFICANT CHANGE UP (ref 1.5–8.5)
IMM GRANULOCYTES NFR BLD AUTO: 0.3 % — SIGNIFICANT CHANGE UP (ref 0–1.5)
KETONES UR-MCNC: NEGATIVE — SIGNIFICANT CHANGE UP
LEUKOCYTE ESTERASE UR-ACNC: NEGATIVE — SIGNIFICANT CHANGE UP
LIDOCAIN IGE QN: 31 U/L — SIGNIFICANT CHANGE UP (ref 7–60)
LYMPHOCYTES # BLD AUTO: 1.35 K/UL — SIGNIFICANT CHANGE UP (ref 1–3.3)
LYMPHOCYTES # BLD AUTO: 12.4 % — LOW (ref 13–44)
MCHC RBC-ENTMCNC: 30 PG — SIGNIFICANT CHANGE UP (ref 27–34)
MCHC RBC-ENTMCNC: 32.7 GM/DL — SIGNIFICANT CHANGE UP (ref 32–36)
MCV RBC AUTO: 91.7 FL — SIGNIFICANT CHANGE UP (ref 80–100)
MONOCYTES # BLD AUTO: 1.08 K/UL — HIGH (ref 0–0.9)
MONOCYTES NFR BLD AUTO: 9.9 % — SIGNIFICANT CHANGE UP (ref 2–14)
NEUTROPHILS # BLD AUTO: 8.35 K/UL — HIGH (ref 1.8–7.4)
NEUTROPHILS NFR BLD AUTO: 76.3 % — SIGNIFICANT CHANGE UP (ref 43–77)
NITRITE UR-MCNC: NEGATIVE — SIGNIFICANT CHANGE UP
NRBC # BLD: 0 /100 WBCS — SIGNIFICANT CHANGE UP
NRBC # FLD: 0 K/UL — SIGNIFICANT CHANGE UP
PH UR: 6 — SIGNIFICANT CHANGE UP (ref 5–8)
PLATELET # BLD AUTO: 212 K/UL — SIGNIFICANT CHANGE UP (ref 150–400)
POTASSIUM SERPL-MCNC: 4 MMOL/L — SIGNIFICANT CHANGE UP (ref 3.5–5.3)
POTASSIUM SERPL-MCNC: 4.1 MMOL/L — SIGNIFICANT CHANGE UP (ref 3.5–5.3)
POTASSIUM SERPL-MCNC: 4.3 MMOL/L — SIGNIFICANT CHANGE UP (ref 3.5–5.3)
POTASSIUM SERPL-SCNC: 4 MMOL/L — SIGNIFICANT CHANGE UP (ref 3.5–5.3)
POTASSIUM SERPL-SCNC: 4.1 MMOL/L — SIGNIFICANT CHANGE UP (ref 3.5–5.3)
POTASSIUM SERPL-SCNC: 4.3 MMOL/L — SIGNIFICANT CHANGE UP (ref 3.5–5.3)
PROT SERPL-MCNC: 6.4 G/DL — SIGNIFICANT CHANGE UP (ref 6–8.3)
PROT SERPL-MCNC: 7.2 G/DL — SIGNIFICANT CHANGE UP (ref 6–8.3)
PROT UR-MCNC: NEGATIVE — SIGNIFICANT CHANGE UP
RAPID RVP RESULT: SIGNIFICANT CHANGE UP
RBC # BLD: 5.07 M/UL — SIGNIFICANT CHANGE UP (ref 4.2–5.8)
RBC # FLD: 12.7 % — SIGNIFICANT CHANGE UP (ref 10.3–14.5)
RBC CASTS # UR COMP ASSIST: 1 /HPF — SIGNIFICANT CHANGE UP (ref 0–4)
RSV RNA SPEC QL NAA+PROBE: SIGNIFICANT CHANGE UP
RV+EV RNA SPEC QL NAA+PROBE: SIGNIFICANT CHANGE UP
SARS-COV-2 RNA SPEC QL NAA+PROBE: SIGNIFICANT CHANGE UP
SODIUM SERPL-SCNC: 137 MMOL/L — SIGNIFICANT CHANGE UP (ref 135–145)
SODIUM SERPL-SCNC: 138 MMOL/L — SIGNIFICANT CHANGE UP (ref 135–145)
SODIUM SERPL-SCNC: 139 MMOL/L — SIGNIFICANT CHANGE UP (ref 135–145)
SP GR SPEC: 1.01 — LOW (ref 1.01–1.02)
UROBILINOGEN FLD QL: SIGNIFICANT CHANGE UP
WBC # BLD: 10.93 K/UL — HIGH (ref 3.8–10.5)
WBC # FLD AUTO: 10.93 K/UL — HIGH (ref 3.8–10.5)
WBC UR QL: 0 /HPF — SIGNIFICANT CHANGE UP (ref 0–5)

## 2021-03-23 RX ORDER — MORPHINE SULFATE 50 MG/1
4 CAPSULE, EXTENDED RELEASE ORAL ONCE
Refills: 0 | Status: DISCONTINUED | OUTPATIENT
Start: 2021-03-23 | End: 2021-03-23

## 2021-03-23 RX ORDER — TAMSULOSIN HYDROCHLORIDE 0.4 MG/1
0.4 CAPSULE ORAL AT BEDTIME
Refills: 0 | Status: DISCONTINUED | OUTPATIENT
Start: 2021-03-23 | End: 2021-03-25

## 2021-03-23 RX ORDER — ACETAMINOPHEN 500 MG
975 TABLET ORAL ONCE
Refills: 0 | Status: COMPLETED | OUTPATIENT
Start: 2021-03-23 | End: 2021-03-23

## 2021-03-23 RX ORDER — ONDANSETRON 8 MG/1
4 TABLET, FILM COATED ORAL ONCE
Refills: 0 | Status: COMPLETED | OUTPATIENT
Start: 2021-03-23 | End: 2021-03-23

## 2021-03-23 RX ORDER — KETOROLAC TROMETHAMINE 30 MG/ML
15 SYRINGE (ML) INJECTION ONCE
Refills: 0 | Status: DISCONTINUED | OUTPATIENT
Start: 2021-03-23 | End: 2021-03-23

## 2021-03-23 RX ORDER — SODIUM CHLORIDE 9 MG/ML
1000 INJECTION INTRAMUSCULAR; INTRAVENOUS; SUBCUTANEOUS
Refills: 0 | Status: DISCONTINUED | OUTPATIENT
Start: 2021-03-23 | End: 2021-03-25

## 2021-03-23 RX ORDER — SODIUM CHLORIDE 9 MG/ML
1000 INJECTION INTRAMUSCULAR; INTRAVENOUS; SUBCUTANEOUS ONCE
Refills: 0 | Status: COMPLETED | OUTPATIENT
Start: 2021-03-23 | End: 2021-03-23

## 2021-03-23 RX ORDER — OXYCODONE HYDROCHLORIDE 5 MG/1
5 TABLET ORAL EVERY 6 HOURS
Refills: 0 | Status: DISCONTINUED | OUTPATIENT
Start: 2021-03-23 | End: 2021-03-25

## 2021-03-23 RX ORDER — ACETAMINOPHEN 500 MG
650 TABLET ORAL EVERY 6 HOURS
Refills: 0 | Status: DISCONTINUED | OUTPATIENT
Start: 2021-03-23 | End: 2021-03-25

## 2021-03-23 RX ADMIN — OXYCODONE HYDROCHLORIDE 5 MILLIGRAM(S): 5 TABLET ORAL at 12:21

## 2021-03-23 RX ADMIN — MORPHINE SULFATE 4 MILLIGRAM(S): 50 CAPSULE, EXTENDED RELEASE ORAL at 04:53

## 2021-03-23 RX ADMIN — TAMSULOSIN HYDROCHLORIDE 0.4 MILLIGRAM(S): 0.4 CAPSULE ORAL at 22:44

## 2021-03-23 RX ADMIN — OXYCODONE HYDROCHLORIDE 5 MILLIGRAM(S): 5 TABLET ORAL at 22:45

## 2021-03-23 RX ADMIN — Medication 15 MILLIGRAM(S): at 04:53

## 2021-03-23 RX ADMIN — SODIUM CHLORIDE 1000 MILLILITER(S): 9 INJECTION INTRAMUSCULAR; INTRAVENOUS; SUBCUTANEOUS at 04:53

## 2021-03-23 RX ADMIN — ONDANSETRON 4 MILLIGRAM(S): 8 TABLET, FILM COATED ORAL at 04:53

## 2021-03-23 RX ADMIN — SODIUM CHLORIDE 1000 MILLILITER(S): 9 INJECTION INTRAMUSCULAR; INTRAVENOUS; SUBCUTANEOUS at 10:31

## 2021-03-23 RX ADMIN — Medication 975 MILLIGRAM(S): at 18:52

## 2021-03-23 RX ADMIN — OXYCODONE HYDROCHLORIDE 5 MILLIGRAM(S): 5 TABLET ORAL at 11:21

## 2021-03-23 RX ADMIN — SODIUM CHLORIDE 125 MILLILITER(S): 9 INJECTION INTRAMUSCULAR; INTRAVENOUS; SUBCUTANEOUS at 12:19

## 2021-03-23 NOTE — ED PROVIDER NOTE - ATTENDING CONTRIBUTION TO CARE
Afebrile. Awake and Alert. Lungs CTA. Heart RRR. Abdomen soft NTND. CN II-XII grossly intact. Moves all extremities without lateralization. No CVA TTP.    Known 4 mm ureterolithiasis  r/o UTI  Pain management

## 2021-03-23 NOTE — ED ADULT TRIAGE NOTE - CHIEF COMPLAINT QUOTE
Pt reports to ED complaining of left flank pain x two days. Pt was seen in ED on Sunday for same symptoms with negative workup. Pt denies any urinary symptoms, nausea, vomiting. Pt denies pertinent medical history.

## 2021-03-23 NOTE — ED CDU PROVIDER INITIAL DAY NOTE - MEDICAL DECISION MAKING DETAILS
45 y/o male with PMHx of renal colic who presented to the ED for left flank pain X 1 day. Found to have a 4mm stone with elevated Cr. Sent to [facility] for IVF, pain control, and repeat labs  Concern for renal colic  IVF, pain control, and repeat labs

## 2021-03-23 NOTE — ED CDU PROVIDER INITIAL DAY NOTE - PROGRESS NOTE DETAILS
following with Dr. Hutton. urology paged for consult. plan for fluids, pain control, repeat cr CDU Att PN: Brennen  Pt feeling well, no pain at present. Received 1 L so far, pending 2nd Liter.  Will repeat BMP after 2nd Liter, if Cr not improving will c/s .  Otherwise, possible DC for outpt  f/u if Cr trending down.  I have personally performed a face to face evaluation of this patient including review of the history and focused physical exam.  I have discussed the case and reviewed the plan of care with the PA. pt spiked fever rectally 100.5. will send urine cx, check blood cx, vbg, cxr r/o infection. UA negative. COVID negative. +chills. pt denies any other symptoms. discussed w/ urology will give IV doxy, will discussed w/ their attending. pt allergic to penicillin as child with swelling. pt signed out to [facility] [employer].

## 2021-03-23 NOTE — ED PROVIDER NOTE - PROGRESS NOTE DETAILS
Dr. Toledo: Pt signed out to me awaiting CT scan and lab results. Pt currently resting in NAD. Discussed US findings and awaiting labs/CT results. Dr. Toledo: Discussed CT and lab results with pt. Pt states feeling better. Noted 4mm stone and elevation in Cr. Offered OBS for monitoring and repeat labs which pt agreed to.

## 2021-03-23 NOTE — ED PROVIDER NOTE - NS ED ROS FT
GENERAL: No fever or chills, EYES: no change in vision, HEENT: no trouble swallowing or speaking, CARDIAC: no chest pain, PULMONARY: no cough or SOB,   GI: L flank pain, no abdominal pain, no nausea, no vomiting, no diarrhea or constipation, : No changes in urination, SKIN: no rashes, NEURO: no headache,  MSK: No joint pain ~Rohan Mcdermott M.D. Resident

## 2021-03-23 NOTE — ED PROVIDER NOTE - CLINICAL SUMMARY MEDICAL DECISION MAKING FREE TEXT BOX
46yM h/o kidney stones presents L flank pain of 1 day duration. Concern for but not limited to Stone vs Gastritis vs MSK. Will get labs, lipase, UA, manage pain, +/- imaging and reassess

## 2021-03-23 NOTE — ED ADULT NURSE NOTE - OBJECTIVE STATEMENT
Fever greater than 101/Pain not relieved by Medications Patient arrives to intake for left side flank pain radiating to his groin x2 days. A&OX4. Patient seen in the ER two days ago for the same sx and the workup was negative. Patient denies any chest pain, SOB, dizziness, headache , hematuria, frequency, urgency, or burning during urination. Patient reports he has a hx of kidney stones. Patient breathing even and nonlabored but appears uncomfortable. 20g IV placed to right arm. Labs sent as ordered. Patient medicated as ordered. Safety maintained. Patient stable upon exiting the room.

## 2021-03-23 NOTE — ED PROVIDER NOTE - OBJECTIVE STATEMENT
46yM h/o kidney stones presents L flank pain. Patient 46yM h/o kidney stones presents L flank pain of 1 day duration. Patient was evaluated here 2 days prior and found to have a 4mm non obstructing stone in R kidney associated with nausea. Endorse constant achy L flank pain with radiation to L groin. No fever, chest pain, sob, urinary or bowel irregularities.

## 2021-03-23 NOTE — ED CDU PROVIDER INITIAL DAY NOTE - OBJECTIVE STATEMENT
47 y/o male with PMHx of renal colic who presented to the ED for left flank pain X 1 day. Pt states over the past day having worsening left flank pain. Pt was seen 2 days prior and noted to have a kidney stone. Pt states pain improved and was sent home but returned for worsening pain. Pt denies any fever, chills, nausea, vomiting, SOB, chest pain, diarrhea, or dysuria.

## 2021-03-23 NOTE — ED CDU PROVIDER INITIAL DAY NOTE - ATTENDING CONTRIBUTION TO CARE
Pt was seen and evaluated by me. Pt is a 47 y/o male with PMHx of renal colic who presented to the ED for left flank pain X 1 day. Pt states over the past day having worsening left flank pain. Pt was seen 2 days prior and noted to have a kidney stone. Pt states pain improved and was sent home but returned for worsening pain. Pt denies any fever, chills, nausea, vomiting, SOB, chest pain, diarrhea, or dysuria. Lungs CTA b/l. RRR. Abd soft, non-tender. No CVAT.  Concern for renal colic  IVF, pain control, and repeat labs

## 2021-03-23 NOTE — ED PROVIDER NOTE - PHYSICAL EXAMINATION
Gen: AAOx3, uncomfortable 2/2 pain  Head: NCAT  HEENT: EOMI, oral mucosa moist, normal conjunctiva  Lung: CTAB, no respiratory distress, speaking in full sentences  CV: RRR, no murmurs, rubs or gallops  Abd: soft, NTND, no guarding, L CVA tenderness  MSK: no visible deformities  Neuro: No focal sensory or motor deficits  Skin: Warm, well perfused, no rash  Psych: normal affect.   ~Rohan Mcdermott M.D. Resident

## 2021-03-24 DIAGNOSIS — N20.0 CALCULUS OF KIDNEY: ICD-10-CM

## 2021-03-24 DIAGNOSIS — N17.9 ACUTE KIDNEY FAILURE, UNSPECIFIED: ICD-10-CM

## 2021-03-24 DIAGNOSIS — N23 UNSPECIFIED RENAL COLIC: ICD-10-CM

## 2021-03-24 DIAGNOSIS — Z29.9 ENCOUNTER FOR PROPHYLACTIC MEASURES, UNSPECIFIED: ICD-10-CM

## 2021-03-24 LAB
ALBUMIN SERPL ELPH-MCNC: 3.7 G/DL — SIGNIFICANT CHANGE UP (ref 3.3–5)
ALP SERPL-CCNC: 86 U/L — SIGNIFICANT CHANGE UP (ref 40–120)
ALT FLD-CCNC: 24 U/L — SIGNIFICANT CHANGE UP (ref 4–41)
ANION GAP SERPL CALC-SCNC: 11 MMOL/L — SIGNIFICANT CHANGE UP (ref 7–14)
AST SERPL-CCNC: 23 U/L — SIGNIFICANT CHANGE UP (ref 4–40)
BASOPHILS # BLD AUTO: 0.02 K/UL — SIGNIFICANT CHANGE UP (ref 0–0.2)
BASOPHILS NFR BLD AUTO: 0.2 % — SIGNIFICANT CHANGE UP (ref 0–2)
BILIRUB SERPL-MCNC: 1.1 MG/DL — SIGNIFICANT CHANGE UP (ref 0.2–1.2)
BUN SERPL-MCNC: 14 MG/DL — SIGNIFICANT CHANGE UP (ref 7–23)
CALCIUM SERPL-MCNC: 8.8 MG/DL — SIGNIFICANT CHANGE UP (ref 8.4–10.5)
CHLORIDE SERPL-SCNC: 104 MMOL/L — SIGNIFICANT CHANGE UP (ref 98–107)
CO2 SERPL-SCNC: 21 MMOL/L — LOW (ref 22–31)
CREAT SERPL-MCNC: 1.69 MG/DL — HIGH (ref 0.5–1.3)
CULTURE RESULTS: SIGNIFICANT CHANGE UP
EOSINOPHIL # BLD AUTO: 0.09 K/UL — SIGNIFICANT CHANGE UP (ref 0–0.5)
EOSINOPHIL NFR BLD AUTO: 0.8 % — SIGNIFICANT CHANGE UP (ref 0–6)
GLUCOSE SERPL-MCNC: 94 MG/DL — SIGNIFICANT CHANGE UP (ref 70–99)
HCT VFR BLD CALC: 42.8 % — SIGNIFICANT CHANGE UP (ref 39–50)
HGB BLD-MCNC: 13.9 G/DL — SIGNIFICANT CHANGE UP (ref 13–17)
IANC: 8.31 K/UL — SIGNIFICANT CHANGE UP (ref 1.5–8.5)
IMM GRANULOCYTES NFR BLD AUTO: 0.3 % — SIGNIFICANT CHANGE UP (ref 0–1.5)
LYMPHOCYTES # BLD AUTO: 1.48 K/UL — SIGNIFICANT CHANGE UP (ref 1–3.3)
LYMPHOCYTES # BLD AUTO: 13.3 % — SIGNIFICANT CHANGE UP (ref 13–44)
MCHC RBC-ENTMCNC: 30.2 PG — SIGNIFICANT CHANGE UP (ref 27–34)
MCHC RBC-ENTMCNC: 32.5 GM/DL — SIGNIFICANT CHANGE UP (ref 32–36)
MCV RBC AUTO: 92.8 FL — SIGNIFICANT CHANGE UP (ref 80–100)
MONOCYTES # BLD AUTO: 1.18 K/UL — HIGH (ref 0–0.9)
MONOCYTES NFR BLD AUTO: 10.6 % — SIGNIFICANT CHANGE UP (ref 2–14)
NEUTROPHILS # BLD AUTO: 8.31 K/UL — HIGH (ref 1.8–7.4)
NEUTROPHILS NFR BLD AUTO: 74.8 % — SIGNIFICANT CHANGE UP (ref 43–77)
NRBC # BLD: 0 /100 WBCS — SIGNIFICANT CHANGE UP
NRBC # FLD: 0 K/UL — SIGNIFICANT CHANGE UP
PLATELET # BLD AUTO: 247 K/UL — SIGNIFICANT CHANGE UP (ref 150–400)
POTASSIUM SERPL-MCNC: 3.9 MMOL/L — SIGNIFICANT CHANGE UP (ref 3.5–5.3)
POTASSIUM SERPL-SCNC: 3.9 MMOL/L — SIGNIFICANT CHANGE UP (ref 3.5–5.3)
PROT SERPL-MCNC: 6.6 G/DL — SIGNIFICANT CHANGE UP (ref 6–8.3)
RBC # BLD: 4.61 M/UL — SIGNIFICANT CHANGE UP (ref 4.2–5.8)
RBC # FLD: 12.4 % — SIGNIFICANT CHANGE UP (ref 10.3–14.5)
SODIUM SERPL-SCNC: 136 MMOL/L — SIGNIFICANT CHANGE UP (ref 135–145)
SPECIMEN SOURCE: SIGNIFICANT CHANGE UP
WBC # BLD: 11.11 K/UL — HIGH (ref 3.8–10.5)
WBC # FLD AUTO: 11.11 K/UL — HIGH (ref 3.8–10.5)

## 2021-03-24 PROCEDURE — 52332 CYSTOSCOPY AND TREATMENT: CPT | Mod: LT

## 2021-03-24 PROCEDURE — 99222 1ST HOSP IP/OBS MODERATE 55: CPT

## 2021-03-24 PROCEDURE — 99222 1ST HOSP IP/OBS MODERATE 55: CPT | Mod: 57

## 2021-03-24 PROCEDURE — 74420 UROGRAPHY RTRGR +-KUB: CPT | Mod: 26,59

## 2021-03-24 RX ORDER — HYDROMORPHONE HYDROCHLORIDE 2 MG/ML
0.5 INJECTION INTRAMUSCULAR; INTRAVENOUS; SUBCUTANEOUS
Refills: 0 | Status: DISCONTINUED | OUTPATIENT
Start: 2021-03-24 | End: 2021-03-25

## 2021-03-24 RX ORDER — ONDANSETRON 8 MG/1
4 TABLET, FILM COATED ORAL ONCE
Refills: 0 | Status: DISCONTINUED | OUTPATIENT
Start: 2021-03-24 | End: 2021-03-25

## 2021-03-24 RX ADMIN — Medication 650 MILLIGRAM(S): at 03:37

## 2021-03-24 RX ADMIN — Medication 650 MILLIGRAM(S): at 10:10

## 2021-03-24 RX ADMIN — Medication 650 MILLIGRAM(S): at 18:45

## 2021-03-24 RX ADMIN — SODIUM CHLORIDE 125 MILLILITER(S): 9 INJECTION INTRAMUSCULAR; INTRAVENOUS; SUBCUTANEOUS at 13:53

## 2021-03-24 RX ADMIN — OXYCODONE HYDROCHLORIDE 5 MILLIGRAM(S): 5 TABLET ORAL at 13:30

## 2021-03-24 RX ADMIN — Medication 650 MILLIGRAM(S): at 18:15

## 2021-03-24 RX ADMIN — SODIUM CHLORIDE 125 MILLILITER(S): 9 INJECTION INTRAMUSCULAR; INTRAVENOUS; SUBCUTANEOUS at 03:24

## 2021-03-24 RX ADMIN — HYDROMORPHONE HYDROCHLORIDE 0.5 MILLIGRAM(S): 2 INJECTION INTRAMUSCULAR; INTRAVENOUS; SUBCUTANEOUS at 21:36

## 2021-03-24 RX ADMIN — Medication 650 MILLIGRAM(S): at 09:40

## 2021-03-24 RX ADMIN — HYDROMORPHONE HYDROCHLORIDE 0.5 MILLIGRAM(S): 2 INJECTION INTRAMUSCULAR; INTRAVENOUS; SUBCUTANEOUS at 21:21

## 2021-03-24 RX ADMIN — OXYCODONE HYDROCHLORIDE 5 MILLIGRAM(S): 5 TABLET ORAL at 06:53

## 2021-03-24 RX ADMIN — OXYCODONE HYDROCHLORIDE 5 MILLIGRAM(S): 5 TABLET ORAL at 14:00

## 2021-03-24 NOTE — H&P ADULT - NSHPPHYSICALEXAM_GEN_ALL_CORE
General: well developed, nontoxic  Neuro: alert and oriented, no focal deficits, moves all extremities spontaneously  Respiratory: airway patent, respirations unlabored  Abdomen: soft, nontender, nondistended, no CVAT  : Voiding  Skin: warm, dry, appropriate color

## 2021-03-24 NOTE — H&P ADULT - ASSESSMENT
A/P: 45yo M with no PMH who presented to ER with flank pain, found to have L. 4mm proximal ureteral stone (also 3mm nonobstructing R. renal stone).  Admitted to CDU, Tmax 100.5F.     - Admit to Dr. Thomas  - NPO/IVF  - Strain urine  - Continue empiric abx, receiving levaquin in ER  - Added on to OR for cystoscopy, left ureteral stent placement  - Monitor for hemodynamic instability/further fevers

## 2021-03-24 NOTE — ED CDU PROVIDER SUBSEQUENT DAY NOTE - PHYSICAL EXAMINATION
Vital signs reviewed.   CONSTITUTIONAL: Well-appearing; well-nourished; in no apparent distress. Non-toxic appearing.   HEAD: Normocephalic, atraumatic.  EYES: PERRL, EOM intact, conjunctiva and sclera WNL.  ENT: normal nose; no rhinorrhea  CARD: Normal S1, S2;   RESP: NAD  ABD/GI: soft, non-distended; non-tender;   EXT/MS: moves all extremities;   SKIN: Normal for age and race;   NEURO: Awake, alert, oriented x 3, no gross deficits  PSYCH: Normal mood; appropriate affect.

## 2021-03-24 NOTE — ED CDU PROVIDER SUBSEQUENT DAY NOTE - HISTORY
Pt is a  45 y/o male with Pmhx of prior Renal stone who presented to the ED for left flank pain X 1 day.  Pt was seen 2 days prior and noted to have a kidney stone. Pt pain returned causing him to come back to ED. While in ED patient had labs +for ANANYA with CR of 1.8, mild leukocytosis, UA normal. CT + for Lt 4 mm stone. Pt seen by urology and transferred to CDU for IVF, rpt labs, pain control and urology to follow.     While in CDU patient spiked fever to 100.5 F rectally. Pt had CXR ? for LLL opacity but similar finding on previous CT in 2018. Urology was aware, stated to keep patient NPO s/p midnight. RVP normal. Pt pending am labs and urology reassessment. Will continue with current CDU tx plan.

## 2021-03-24 NOTE — ED CDU PROVIDER SUBSEQUENT DAY NOTE - MEDICAL DECISION MAKING DETAILS
Pt is a  47 y/o male with Pmhx of prior Renal stone who presented to the ED for left flank pain X 1 day.  Pt was seen 2 days prior and noted to have a kidney stone. Pt pain returned causing him to come back to ED. While in ED patient had labs +for ANANYA with CR of 1.8, mild leukocytosis, UA normal. CT + for Lt 4 mm stone. Pt seen by urology and transferred to CDU for IVF, rpt labs, pain control and urology to follow.     While in CDU patient spiked fever to 100.5 F rectally. Pt had CXR ? for LLL opacity but similar finding on previous CT in 2018. Urology was aware, stated to keep patient NPO s/p midnight. RVP normal. Pt pending am labs and urology reassessment. Will continue with current CDU tx plan.

## 2021-03-24 NOTE — H&P ADULT - ATTENDING COMMENTS
left ureteral stone with hydronephrosis,  2nd visit to ER    now with mild fever.     Admit to urology and plan for stent placement today    if not passing stone in 2-3 weeks then schedule for ureteroscopy left ureteral stone with hydronephrosis,  2nd visit to ER    now with mild fever.     Admit to urology and plan for stent placement today    if not passing stone in 2-3 weeks then schedule for ureteroscopy     - will call for [facility] paperwork

## 2021-03-24 NOTE — ED CDU PROVIDER SUBSEQUENT DAY NOTE - ATTENDING CONTRIBUTION TO CARE
CDU MD BRAN:  I performed a face to face bedside interview with patient regarding history of present illness, review of symptoms and past medical history. I completed an independent physical exam.  I have discussed patient's plan of care with PA.   I agree with note as stated above, having amended the EMR as needed to reflect my findings. I have discussed the assessment and plan of care.  This includes during the time I functioned as the attending physician for this patient.

## 2021-03-24 NOTE — CONSULT NOTE ADULT - PROBLEM SELECTOR RECOMMENDATION 9
-d/t  4 mm obstructing calculus within the proximal left ureter  -plan for cystoscopy, left ureteral stent today  -pain control, IVF, flomax, levaquin, management per

## 2021-03-24 NOTE — CONSULT NOTE ADULT - ASSESSMENT
47 y/o male p/w Left renal colic, found to have fever 100.5F, CT showed : Mild left hydroureteronephrosis to the level of a 4 mm obstructing calculus within the proximal left ureter. Nonobstructive 3 mm right renal calculus., plan for cysto with left ureteral stent today
46 year old male with no significant pmhx presents as a bounce back for left flank pain in the setting of a 4 mm left proximal ureteral stone and left hydronephrosis. Cr 1.8    Plan:   - Please obtain urine culture   - Continue flomax, IV hydration   - Continue to strain urine   - Pain control   - NPO after MN   - Repeat labs in AM     Plan discussed with attending on call, Dr. Thomas

## 2021-03-24 NOTE — H&P ADULT - NSHPLABSRESULTS_GEN_ALL_CORE
CBC (03-24 @ 06:54)                              13.9                           11.11<H>  )----------------(  247        74.8  % Neutrophils, 13.3  % Lymphocytes, ANC: 8.31<H>                              42.8                CBC (03-23 @ 06:45)                              15.2                           10.93<H>  )----------------(  212        76.3  % Neutrophils, 12.4<L>% Lymphocytes, ANC: 8.35<H>                              46.5                  BMP (03-24 @ 06:54)             136     |  104     |  14    		Ca++ --      Ca 8.8                ---------------------------------( 94    		Mg --                 3.9     |  21<L>   |  1.69<H>			Ph --      BMP (03-23 @ 23:06)             138     |  107     |  14    		Ca++ --      Ca 8.5                ---------------------------------( 104<H>		Mg --                 4.0     |  22      |  1.71<H>			Ph --        LFTs (03-24 @ 06:54)      TPro 6.6 / Alb 3.7 / TBili 1.1 / DBili -- / AST 23 / ALT 24 / AlkPhos 86  LFTs (03-23 @ 23:06)      TPro 6.4 / Alb 3.5 / TBili 0.8 / DBili -- / AST 25 / ALT 22 / AlkPhos 84      ABG (03-23 @ 23:06)      /  /  /  /  / %     Lactate:   1.5  ABG (03-23 @ 19:47)      /  /  /  /  / %     Lactate:   2.3<H>    VBG (03-23 @ 23:06)     7.36 / 43 / 52<H> / 23 / -1.0 / 85.3<H>%  VBG (03-23 @ 19:47)     7.32 / 41 / 167<H> / 20 / -4.7<L> / 98.6<H>%      Radiology:  < from: CT Abdomen and Pelvis No Cont (03.23.21 @ 07:39) >      EXAM:  CT ABDOMEN AND PELVIS        PROCEDURE DATE:  Mar 23 2021         INTERPRETATION:  CLINICAL INFORMATION: Left flank pain, evaluate for nephrolithiasis    COMPARISON: Renal ultrasound from 3/21/2020    CONTRAST/COMPLICATIONS:  IV Contrast: NONE  Oral Contrast: NONE  Complications: None reported at time of study completion    PROCEDURE:  CT of the Abdomen and Pelvis was performed in the prone position.  Sagittal and coronal reformats were performed.    FINDINGS:  LOWER CHEST: Within normallimits.    LIVER: Within normal limits.  BILE DUCTS: Normal caliber.  GALLBLADDER: Within normal limits.  SPLEEN: Within normal limits.  PANCREAS: Within normal limits.  ADRENALS: Within normal limits.  KIDNEYS/URETERS: Mild left hydroureteronephrosis to the level of a 4 mm obstructing calculus within the proximal left ureter. Nonobstructive 3 mm right renal calculus.    BLADDER: Within normal limits.  REPRODUCTIVE ORGANS: Prostate within normal limits.    BOWEL: No bowel obstruction. Appendix isnormal.  PERITONEUM: No ascites.  VESSELS: Within normal limits.  RETROPERITONEUM/LYMPH NODES: No lymphadenopathy.  ABDOMINAL WALL: Within normal limits.  BONES: Within normal limits.    IMPRESSION:  Mild left hydroureteronephrosis to the level of a 4 mm obstructing calculus within the proximal left ureter.      SOY MURILLO MD; Resident Radiology  This document has been electronically signed.  AMEYA GA MD; Attending Radiologist  This document has been electronically signed. Mar 23 2021  8:42AM    < end of copied text >

## 2021-03-24 NOTE — BRIEF OPERATIVE NOTE - NSICDXBRIEFPROCEDURE_GEN_ALL_CORE_FT
PROCEDURES:  Cystoscopy with replacement of left ureteral stent 24-Mar-2021 23:36:21  Milagros Patrick

## 2021-03-24 NOTE — H&P ADULT - HISTORY OF PRESENT ILLNESS
46 year old male with no significant past medical hx presented initially to ED on 3/21 with left flank pain. Re-presents today 3/23 with continued left flank pain. The patient reports that his pain has improved some now that he has received some pain medication. He reports that he was discharged from the ED without any prescriptions and he has been trying to manage the pain with tylenol and motrin. He denies nausea or vomiting. He denies dysuria or gross hematuria. UA in ED negative. CT demonstrates left hydronephrosis with 4 mm left proximal ureteral stone. Also has a right 3 mm nonobstructing renal stone. Cr is elevated to 1.8.     Patient placed in the CDU overnight and had a 100.5F, otherwise stable.  He was started on levaquin.  Overnight, patient had persistent pain.  Counselled on stent placement; patient is in agreement.  Currently afebrile, denies n/v, cp/sob.

## 2021-03-24 NOTE — CONSULT NOTE ADULT - PROBLEM SELECTOR RECOMMENDATION 2
management as above  pt had fever 100.5F, UA neg, f/u blood/urine cultures,. c/w levaquin (pcn allergy)

## 2021-03-24 NOTE — CONSULT NOTE ADULT - SUBJECTIVE AND OBJECTIVE BOX
HPI  46 year old male with no significant past medical hx presented initially to ED on 3/21 with left flank pain. Re-presents today 3/23 with continued left flank pain. The patient reports that his pain has improved some now that he has received some pain medication. He reports that he was discharged from the ED without any prescriptions and he has been trying to manage the pain with tylenol and motrin. He denies nausea or vomiting. He denies dysuria or gross hematuria. UA in ED negative. CT demonstrates left hydronephrosis with 4 mm left proximal ureteral stone. Also has a right 3 mm nonobstructing renal stone. Cr is elevated to 1.8. The patient is afebrile and WBC 10.8.      PAST MEDICAL & SURGICAL HISTORY:  Renal colic on left side    No significant past surgical history        MEDICATIONS  (STANDING):  sodium chloride 0.9%. 1000 milliLiter(s) (125 mL/Hr) IV Continuous <Continuous>  tamsulosin 0.4 milliGRAM(s) Oral at bedtime    MEDICATIONS  (PRN):  acetaminophen   Tablet .. 650 milliGRAM(s) Oral every 6 hours PRN Mild Pain (1 - 3), Moderate Pain (4 - 6)  oxyCODONE    IR 5 milliGRAM(s) Oral every 6 hours PRN Severe Pain (7 - 10)      FAMILY HISTORY:  No pertinent family history in first degree relatives        Allergies    penicillin (Hives)    Intolerances        SOCIAL HISTORY:    REVIEW OF SYSTEMS:   Otherwise negative as stated in HPI    Physical Exam  Vital signs  T(C): 36.7 (21 @ 11:05), Max: 36.9 (21 @ 05:42)  HR: 64 (21 @ 11:05)  BP: 129/94 (21 @ 11:05)  SpO2: 100% (21 @ 11:05)  Wt(kg): --    Output      Gen:  NAD    Pulm:  No respiratory distress  	  CV:  RRR    GI:  S/ND/NT    :  No CVA tenderness     MSK:  Moves all extremities equally     LABS:       @ 11:30    WBC --    / Hct --    / SCr 1.80      @ 06:45    WBC 10.93 / Hct 46.5  / SCr 1.81         139  |  104  |  15  ----------------------------<  97  4.3   |  24  |  1.80<H>    Ca    9.0      23 Mar 2021 11:30    TPro  7.2  /  Alb  4.1  /  TBili  0.6  /  DBili  x   /  AST  29  /  ALT  26  /  AlkPhos  99  03-23      Urinalysis Basic - ( 23 Mar 2021 06:45 )    Color: Colorless / Appearance: Clear / S.009 / pH: x  Gluc: x / Ketone: Negative  / Bili: Negative / Urobili: <2 mg/dL   Blood: x / Protein: Negative / Nitrite: Negative   Leuk Esterase: Negative / RBC: 1 /HPF / WBC 0 /HPF   Sq Epi: x / Non Sq Epi: x / Bacteria: x        Urine Cx: pending   Blood Cx:    RADIOLOGY:    
Laxmi Razo MD  Pager [phone]    HPI:  46 year old male with no significant past medical hx presented initially to ED on 3/21 with left flank pain, then came in again on 3/23 for left flank pain. Pt states pain started 3 days ago, with left flank pain radiating to his groin, associated with nausea but no vomiting. He took advil 400mg x3 doses and tylenol 500mg x3 doses without significant relief, and the pain was so intense that it woke him up from sleep.  He denies any other medical problem, but reports he had a scan 2 years ago that showed he had a kidney stone.     In the ED, UA was negative, CT showed mild left hydronephrosis with 4 mm left proximal ureteral stone. Also has a right 3 mm nonobstructing renal stone. Cr is elevated to 1.8. Pt was febrile to 100.5F. He was started on levaquin, IVF, plan for cysto/left stent today.  He denies chest pain/sob/dizziness.     PAST MEDICAL & SURGICAL HISTORY:  Renal colic on left side    No significant past surgical history        Review of Systems:   CONSTITUTIONAL: No fever, weight loss, or fatigue  EYES: No eye pain, visual disturbances, or discharge  ENMT:  No difficulty hearing, tinnitus, vertigo; No sinus or throat pain  NECK: No pain or stiffness  BREASTS: No pain, masses, or nipple discharge  RESPIRATORY: No cough, wheezing, chills or hemoptysis; No shortness of breath  CARDIOVASCULAR: No chest pain, palpitations, dizziness, or leg swelling  GASTROINTESTINAL: left flank pain with groin radiation, nausea, no vomiting, or hematemesis; No diarrhea or constipation. No melena or hematochezia.  GENITOURINARY: No dysuria, frequency, hematuria, or incontinence  NEUROLOGICAL: No headaches, memory loss, loss of strength, numbness, or tremors  SKIN: No itching, burning, rashes, or lesions   LYMPH NODES: No enlarged glands  ENDOCRINE: No heat or cold intolerance; No hair loss  MUSCULOSKELETAL: No joint pain or swelling; No muscle, back, or extremity pain  PSYCHIATRIC: No depression, anxiety, mood swings, or difficulty sleeping  HEME/LYMPH: No easy bruising, or bleeding gums  ALLERY AND IMMUNOLOGIC: No hives or eczema    Allergies    penicillin (Hives)    Intolerances      Social History: denies smoking or alcohol abuse    FAMILY HISTORY:  No pertinent family history in first degree relatives        Home Medications: none    MEDICATIONS  (STANDING):  levoFLOXacin IVPB 750 milliGRAM(s) IV Intermittent every 48 hours  sodium chloride 0.9%. 1000 milliLiter(s) (125 mL/Hr) IV Continuous <Continuous>  tamsulosin 0.4 milliGRAM(s) Oral at bedtime    MEDICATIONS  (PRN):  acetaminophen   Tablet .. 650 milliGRAM(s) Oral every 6 hours PRN Mild Pain (1 - 3), Moderate Pain (4 - 6)  oxyCODONE    IR 5 milliGRAM(s) Oral every 6 hours PRN Severe Pain (7 - 10)      Vital Signs Last 24 Hrs  T(C): 36.9 (24 Mar 2021 10:00), Max: 38.1 (23 Mar 2021 18:20)  T(F): 98.5 (24 Mar 2021 10:00), Max: 100.5 (23 Mar 2021 18:20)  HR: 83 (24 Mar 2021 10:00) (83 - 94)  BP: 120/80 (24 Mar 2021 10:00) (120/70 - 143/74)  BP(mean): --  RR: 16 (24 Mar 2021 10:00) (15 - 17)  SpO2: 96% (24 Mar 2021 10:00) (96% - 100%)  CAPILLARY BLOOD GLUCOSE        I&O's Summary      PHYSICAL EXAM:  GENERAL: NAD, well-developed  HEAD:  Atraumatic, Normocephalic  EYES: EOMI, PERRLA, conjunctiva and sclera clear  NECK: Supple, No JVD  CHEST/LUNG: Clear to auscultation bilaterally; No wheeze  HEART: Regular rate and rhythm; No murmurs, rubs, or gallops  ABDOMEN: Soft, Nontender, Nondistended; Bowel sounds present, no CVAT  EXTREMITIES:  2+ Peripheral Pulses, No clubbing, cyanosis, or edema  PSYCH: AAOx3  NEUROLOGY: non-focal  SKIN: No rashes or lesions    LABS:                        13.9   11.11 )-----------( 247      ( 24 Mar 2021 06:54 )             42.8         136  |  104  |  14  ----------------------------<  94  3.9   |  21<L>  |  1.69<H>    Ca    8.8      24 Mar 2021 06:54    TPro  6.6  /  Alb  3.7  /  TBili  1.1  /  DBili  x   /  AST  23  /  ALT  24  /  AlkPhos  86  -          Urinalysis Basic - ( 23 Mar 2021 06:45 )    Color: Colorless / Appearance: Clear / S.009 / pH: x  Gluc: x / Ketone: Negative  / Bili: Negative / Urobili: <2 mg/dL   Blood: x / Protein: Negative / Nitrite: Negative   Leuk Esterase: Negative / RBC: 1 /HPF / WBC 0 /HPF   Sq Epi: x / Non Sq Epi: x / Bacteria: x      Microbiology     RADIOLOGY & ADDITIONAL TESTS:    Imaging Personally Reviewed:  rad< from: Xray Chest 2 Views PA/Lat (21 @ 18:47) >  IMPRESSION:    Left lower lung opacity may represent focus of infection or atelectasis.      < end of copied text >  < from: CT Abdomen and Pelvis No Cont (21 @ 07:39) >  KIDNEYS/URETERS: Mild left hydroureteronephrosis to the level of a 4 mm obstructing calculus within the proximal left ureter. Nonobstructive 3 mm right renal calculus.    BLADDER: Within normal limits.  REPRODUCTIVE ORGANS: Prostate within normal limits.    BOWEL: No bowel obstruction. Appendix isnormal.  PERITONEUM: No ascites.  VESSELS: Within normal limits.  RETROPERITONEUM/LYMPH NODES: No lymphadenopathy.  ABDOMINAL WALL: Within normal limits.  BONES: Within normal limits.    IMPRESSION:  Mild left hydroureteronephrosis to the level of a 4 mm obstructing calculus within the proximal left ureter.      Consultant(s) Notes Reviewed:      Care Discussed with Consultants/Other Providers: urology MAXIMILIANO Quiñonez stent today

## 2021-03-24 NOTE — ED CDU PROVIDER DISPOSITION NOTE - CLINICAL COURSE
47 y/o male with Pmhx of prior Renal stone who presented to the ED for left flank pain X 1 day.  P While in ED patient had labs +for ANANYA with CR of 1.8, mild leukocytosis, UA normal. CT + for Lt 4 mm stone. Pt seen by urology and transferred to CDU for IVF, rpt labs, pain control and urology to follow. While in CDU patient spiked fever to 100.5 F rectally. Pt had CXR ? for LLL opacity but similar finding on previous CT in 2018. Urology was aware, stated to keep patient NPO s/p midnight. ADmit to Dr. Thomas for stent.

## 2021-03-24 NOTE — ED CDU PROVIDER SUBSEQUENT DAY NOTE - PROGRESS NOTE DETAILS
Uro saw this AM. Wants to admit for stent. Patient agreeable to dispo. Will admit to Dr. Thomas. Pt continues to report mild left sided flank pain.  VSS.  Urology to admit pt for stent.

## 2021-03-24 NOTE — CONSULT NOTE ADULT - PROBLEM SELECTOR RECOMMENDATION 3
in setting of obstructing L ureteral stone, mild dehydration and NSAID(took advil)  -creat 1.8 on admission  -IVF, for cysto/stent today, avoid nephrotoxins  -trend creat

## 2021-03-25 ENCOUNTER — TRANSCRIPTION ENCOUNTER (OUTPATIENT)
Age: 46
End: 2021-03-25

## 2021-03-25 VITALS
HEART RATE: 75 BPM | RESPIRATION RATE: 18 BRPM | TEMPERATURE: 99 F | SYSTOLIC BLOOD PRESSURE: 142 MMHG | OXYGEN SATURATION: 96 % | DIASTOLIC BLOOD PRESSURE: 74 MMHG

## 2021-03-25 PROCEDURE — 99232 SBSQ HOSP IP/OBS MODERATE 35: CPT

## 2021-03-25 PROCEDURE — 99239 HOSP IP/OBS DSCHRG MGMT >30: CPT

## 2021-03-25 RX ORDER — OXYCODONE HYDROCHLORIDE 5 MG/1
1 TABLET ORAL
Qty: 8 | Refills: 0
Start: 2021-03-25

## 2021-03-25 RX ORDER — ACETAMINOPHEN 500 MG
2 TABLET ORAL
Qty: 0 | Refills: 0 | DISCHARGE
Start: 2021-03-25

## 2021-03-25 RX ORDER — TAMSULOSIN HYDROCHLORIDE 0.4 MG/1
1 CAPSULE ORAL
Qty: 30 | Refills: 0
Start: 2021-03-25

## 2021-03-25 RX ORDER — AZTREONAM 2 G
1 VIAL (EA) INJECTION
Qty: 20 | Refills: 0
Start: 2021-03-25 | End: 2021-04-03

## 2021-03-25 RX ORDER — HEPARIN SODIUM 5000 [USP'U]/ML
5000 INJECTION INTRAVENOUS; SUBCUTANEOUS EVERY 8 HOURS
Refills: 0 | Status: DISCONTINUED | OUTPATIENT
Start: 2021-03-25 | End: 2021-03-25

## 2021-03-25 RX ADMIN — HEPARIN SODIUM 5000 UNIT(S): 5000 INJECTION INTRAVENOUS; SUBCUTANEOUS at 06:45

## 2021-03-25 RX ADMIN — SODIUM CHLORIDE 125 MILLILITER(S): 9 INJECTION INTRAMUSCULAR; INTRAVENOUS; SUBCUTANEOUS at 00:00

## 2021-03-25 NOTE — PROGRESS NOTE ADULT - ASSESSMENT
47 yo M admitted 3/24/2021 with low grade fever, renal colic secondary to 4mm left proximal ureteral calculus, placed on levaquin and underwent placement of ureteral stent    3/25: pt remained afebrile and pain free, voiding well    -no labs  -IVL  -Ucx neg, Bcx NGTD  -culture list  -d/c home on bactrim for 10 days  -f/u with Dr. Thomas in 3 weeks 45 yo M admitted 3/24/2021 with low grade fever, renal colic secondary to 4mm left proximal ureteral calculus, placed on levaquin and underwent placement of ureteral stent    3/25: pt remained afebrile and pain free, voiding well    -no labs  -IVL  -Ucx neg, Bcx NGTD  -culture list  -d/c home on bactrim for 10 days  -f/u with Dr. Thomas in 3 weeks    reviewed, discussed with patient  I agree with assessment and plan as per PA note

## 2021-03-25 NOTE — DISCHARGE NOTE PROVIDER - HOSPITAL COURSE
45 yo M admitted 3/24/2021 with low grade fever, renal colic secondary to 4mm left proximal ureteral calculus, placed on levaquin and underwent placement of ureteral stent    3/25: pt remained afebrile and pain free, voiding well, Ucx negative, Bcx NGTD, pt d/c on bactrim x 10 days to f/u with Dr. Thomas.

## 2021-03-25 NOTE — DISCHARGE NOTE NURSING/CASE MANAGEMENT/SOCIAL WORK - NSDCPNINST_GEN_ALL_CORE
Notify   Notify dr Thomas if you experience any increase in pain not relieved with medication, any bright red blood or clots in urine or if you develop a fever >100.5 or shaking chills.  drink plenty of fluids.  no heavy lifting or straining.

## 2021-03-25 NOTE — DISCHARGE NOTE PROVIDER - CARE PROVIDER_API CALL
Jaron Thomas; PhD)  Urology  [address], [address]  [place] [address]  Phone: [phone]  Fax: [phone]  Follow Up Time:

## 2021-03-25 NOTE — PROGRESS NOTE ADULT - ASSESSMENT
47 y/o male p/w Left renal colic, found to have fever 100.5F, CT showed : Mild left hydroureteronephrosis to the level of a 4 mm obstructing calculus within the proximal left ureter. Nonobstructive 3 mm right renal calculus., plan for cysto with left ureteral stent today

## 2021-03-25 NOTE — PROGRESS NOTE ADULT - PROBLEM SELECTOR PLAN 2
management as above  pt had fever 100.5F, UA neg, urine cx normal ezra, 3/23 blood cx ngtd,.  on levaquin (pcn allergy), d/c home today on bactrim x10 days per .

## 2021-03-25 NOTE — PROGRESS NOTE ADULT - ASSESSMENT
46 year old male s/p cystoscopy and left ureteral stent placement for renal colic and fevers, left ureteral stone.     -Strict I&O's  -Analgesia prn  -Antiemetics prn  -DVT prophylaxis  -Incentive spirometry  -Regular diet  -OOB  -Continue Levaquin, transition to Bactrim upon discharge.   -follow-up cultures  -Continue Flomax  -likely d/c to home later this am.

## 2021-03-25 NOTE — DISCHARGE NOTE PROVIDER - NSDCCPCAREPLAN_GEN_ALL_CORE_FT
PRINCIPAL DISCHARGE DIAGNOSIS  Diagnosis: Kidney stone  Assessment and Plan of Treatment: You may have intermittent blood tinged urine and slight flank pain when you urinate.  This is normal and due to the stent in your ureter.   If your urine becomes bright red or with clots, please call the office.  You have a ureteral stent to help with tissue healing and drainage of the kidney. Take flomax daily for stent discomfort. The stent is temporary, and must be eventually removed or it may cause problems with infection and kidney damage if left in place greater than 3 months.  Dr. Thomas's office will call you with a follow up appointment for stent/stone removal.  Call the office if you have fever greater than 101, difficulty urinating, pain not relieved with pain medication, nausea/vomiting.

## 2021-03-25 NOTE — DISCHARGE NOTE NURSING/CASE MANAGEMENT/SOCIAL WORK - PATIENT PORTAL LINK FT
You can access the FollowMyHealth Patient Portal offered by [facility] by registering at the following website: [email or web address]. By joining [employer]’s FollowMyHealth portal, you will also be able to view your health information using other applications (apps) compatible with our system.

## 2021-03-25 NOTE — PROGRESS NOTE ADULT - PROBLEM SELECTOR PLAN 1
-d/t  4 mm obstructing calculus within the proximal left ureter  -s/p cysto, left ureteral stent on 3/24  -pain control, IVF, flomax, levaquin, d/c home today on bactrim per

## 2021-03-25 NOTE — PROGRESS NOTE ADULT - SUBJECTIVE AND OBJECTIVE BOX
Note    Post op Check    s/p Cystoscopy, left ureteral stent placement  Reports he is voiding well with some hematuria, denies clots.   EBL 0ml    Pt seen and examined without complaints, reports pain is much improved, denies nausea.     Vital Signs Last 24 Hrs  T(C): 36.7 (25 Mar 2021 01:00), Max: 38.2 (24 Mar 2021 18:15)  T(F): 98 (25 Mar 2021 01:00), Max: 100.8 (24 Mar 2021 18:15)  HR: 65 (25 Mar 2021 01:00) (65 - 87)  BP: 129/78 (25 Mar 2021 01:00) (115/65 - 139/80)  BP(mean): 90 (25 Mar 2021 01:00) (75 - 95)  RR: 19 (25 Mar 2021 01:00) (12 - 19)  SpO2: 100% (25 Mar 2021 01:00) (94% - 100%)    I&O's Summary    24 Mar 2021 07:01  -  25 Mar 2021 02:22  --------------------------------------------------------  IN: 490 mL / OUT: 550 mL / NET: -60 mL    PHYSICAL EXAM:   Constitutional: well appearing, A+O, no distress    Respiratory: clear     Cardiovascular: regular    GI/: soft, nontender, no distention.     Extremities: venodynes in place.   
Overnight events:  None remained afebrile    Subjective:  Pt states he feels good    Objective:    Vital signs  T(C): , Max: 38.2 (03-24-21 @ 18:15)  HR: 67 (03-25-21 @ 06:14)  BP: 120/68 (03-25-21 @ 06:14)  SpO2: 100% (03-25-21 @ 06:14)  Wt(kg): --    Output   Void: 950  03-24 @ 07:01  -  03-25 @ 07:00  --------------------------------------------------------  IN: 1115 mL / OUT: 950 mL / NET: 165 mL      Culture - Blood (03.23.21 @ 23:03)    Specimen Source: .Blood Blood-Peripheral    Culture Results:   No growth to date.      Gen: NAD  Abd: soft, nontender, no CVAT      Labs: none today                        13.9   11.11 )-----------( 247      ( 24 Mar 2021 06:54 )             42.8     24 Mar 2021 06:54    136    |  104    |  14     ----------------------------<  94     3.9     |  21     |  1.69     Ca    8.8        24 Mar 2021 06:54        Urine Cx:   Culture - Urine (03.23.21 @ 19:15)    Specimen Source: .Urine Clean Catch (Midstream)    Culture Results:   <10,000 CFU/mL Normal Urogenital Johana    Blood Cx:   Culture - Blood (03.23.21 @ 23:03)    Specimen Source: .Blood Blood-Venous    Culture Results:   No growth to date.      Imaging:  CT Abdomen and Pelvis No Cont (03.23.21 @ 07:39) >  IMPRESSION:  Mild left hydroureteronephrosis to the level of a 4 mm obstructing calculus within the proximal left ureter.  
Dr. Laxmi Razo  Pager [phone]    PROGRESS NOTE:     Patient is a 46y old  Male who presents with a chief complaint of Left proximal ureteral stone (25 Mar 2021 08:03)      SUBJECTIVE / OVERNIGHT EVENTS: pt denies chest pain/sob/fever/chill/flank pain, feels better  ADDITIONAL REVIEW OF SYSTEMS: afebrile    MEDICATIONS  (STANDING):  heparin   Injectable 5000 Unit(s) SubCutaneous every 8 hours  levoFLOXacin IVPB 750 milliGRAM(s) IV Intermittent every 48 hours  tamsulosin 0.4 milliGRAM(s) Oral at bedtime    MEDICATIONS  (PRN):  acetaminophen   Tablet .. 650 milliGRAM(s) Oral every 6 hours PRN Mild Pain (1 - 3), Moderate Pain (4 - 6)  oxyCODONE    IR 5 milliGRAM(s) Oral every 6 hours PRN Severe Pain (7 - 10)      CAPILLARY BLOOD GLUCOSE        I&O's Summary    24 Mar 2021 07:01  -  25 Mar 2021 07:00  --------------------------------------------------------  IN: 1115 mL / OUT: 950 mL / NET: 165 mL    25 Mar 2021 07:01  -  25 Mar 2021 11:36  --------------------------------------------------------  IN: 0 mL / OUT: 650 mL / NET: -650 mL        PHYSICAL EXAM:  Vital Signs Last 24 Hrs  T(C): 37 (25 Mar 2021 10:29), Max: 38.2 (24 Mar 2021 18:15)  T(F): 98.6 (25 Mar 2021 10:29), Max: 100.8 (24 Mar 2021 18:15)  HR: 75 (25 Mar 2021 10:29) (65 - 86)  BP: 142/74 (25 Mar 2021 10:29) (115/65 - 149/89)  BP(mean): 90 (25 Mar 2021 01:00) (75 - 95)  RR: 18 (25 Mar 2021 10:29) (12 - 19)  SpO2: 96% (25 Mar 2021 10:29) (94% - 100%)  GENERAL: NAD, well-developed  HEAD:  Atraumatic, Normocephalic  EYES: EOMI, PERRLA, conjunctiva and sclera clear  NECK: Supple, No JVD  CHEST/LUNG: Clear to auscultation bilaterally; No wheeze  HEART: Regular rate and rhythm; No murmurs, rubs, or gallops  ABDOMEN: Soft, Nontender, Nondistended; Bowel sounds present, no CVAT  EXTREMITIES:  2+ Peripheral Pulses, No clubbing, cyanosis, or edema  PSYCH: AAOx3  NEUROLOGY: non-focal  SKIN: No rashes or lesions  LABS:                        13.9   11.11 )-----------( 247      ( 24 Mar 2021 06:54 )             42.8     03-24    136  |  104  |  14  ----------------------------<  94  3.9   |  21<L>  |  1.69<H>    Ca    8.8      24 Mar 2021 06:54    TPro  6.6  /  Alb  3.7  /  TBili  1.1  /  DBili  x   /  AST  23  /  ALT  24  /  AlkPhos  86  03-24              Culture - Blood (collected 23 Mar 2021 23:03)  Source: .Blood Blood-Venous  Preliminary Report (25 Mar 2021 01:02):    No growth to date.    Culture - Blood (collected 23 Mar 2021 23:03)  Source: .Blood Blood-Peripheral  Preliminary Report (25 Mar 2021 01:02):    No growth to date.    Culture - Urine (collected 23 Mar 2021 19:15)  Source: .Urine Clean Catch (Midstream)  Final Report (24 Mar 2021 19:27):    <10,000 CFU/mL Normal Urogenital Johana        RADIOLOGY & ADDITIONAL TESTS:  Results Reviewed:   Imaging Personally Reviewed:  Electrocardiogram Personally Reviewed:    COORDINATION OF CARE:  Care Discussed with Consultants/Other Providers [Y/N]: urology TERESA Trevizo d/nafisa home today on bactrim  Prior or Outpatient Records Reviewed [Y/N]:

## 2021-03-25 NOTE — DISCHARGE NOTE PROVIDER - NSDCMRMEDTOKEN_GEN_ALL_CORE_FT
acetaminophen 325 mg oral tablet: 3 tablets every 6 hours as needed for mild to moderate pain  MiraLax oral powder for reconstitution: 1 packet(s) orally once a day as needed for constipation   acetaminophen 325 mg oral tablet: 3 tablets every 6 hours as needed for mild to moderate pain  Bactrim  mg-160 mg oral tablet: 1 tab(s) orally every 12 hours   MiraLax oral powder for reconstitution: 1 packet(s) orally once a day as needed for constipation  oxyCODONE 5 mg oral tablet: 1 tablet every 6 hours as needed for severe pain MDD:4  tamsulosin 0.4 mg oral capsule: 1 cap(s) orally once a day (at bedtime)

## 2021-03-25 NOTE — PROGRESS NOTE ADULT - PROBLEM SELECTOR PLAN 3
in setting of obstructing L ureteral stone, mild dehydration and NSAID(took advil)  -creat 1.8 on admission, last creat 1.6 pre-stent yesterday, no new lab to review  -IVF, for cysto/stent today, avoid nephrotoxins  -trend creat. in setting of obstructing L ureteral stone, mild dehydration and NSAID(took advil)  -creat 1.8 on admission, last creat 1.6 pre-stent yesterday, no new lab to review  -IVF, s/p cysto, left stent, avoid nephrotoxins  -advised pt to avoid Advil/ibuprofen and keep hydrated  -trend creat.

## 2021-03-26 PROBLEM — N23 UNSPECIFIED RENAL COLIC: Chronic | Status: ACTIVE | Noted: 2021-03-23

## 2021-03-26 PROBLEM — Z00.00 ENCOUNTER FOR PREVENTIVE HEALTH EXAMINATION: Status: ACTIVE | Noted: 2021-03-26

## 2021-03-29 LAB
CULTURE RESULTS: SIGNIFICANT CHANGE UP
CULTURE RESULTS: SIGNIFICANT CHANGE UP
SPECIMEN SOURCE: SIGNIFICANT CHANGE UP
SPECIMEN SOURCE: SIGNIFICANT CHANGE UP

## 2021-04-16 ENCOUNTER — APPOINTMENT (OUTPATIENT)
Dept: UROLOGY | Facility: CLINIC | Age: 46
End: 2021-04-16
Payer: COMMERCIAL

## 2021-04-16 VITALS
HEIGHT: 62 IN | HEART RATE: 62 BPM | BODY MASS INDEX: 31.83 KG/M2 | WEIGHT: 173 LBS | DIASTOLIC BLOOD PRESSURE: 88 MMHG | SYSTOLIC BLOOD PRESSURE: 130 MMHG | TEMPERATURE: 98 F

## 2021-04-16 PROCEDURE — 93976 VASCULAR STUDY: CPT

## 2021-04-16 PROCEDURE — 99072 ADDL SUPL MATRL&STAF TM PHE: CPT

## 2021-04-16 PROCEDURE — 99215 OFFICE O/P EST HI 40 MIN: CPT | Mod: 25

## 2021-04-16 PROCEDURE — 76870 US EXAM SCROTUM: CPT

## 2021-04-16 RX ORDER — SULFAMETHOXAZOLE/TRIMETHOPRIM 800-160 MG
TABLET ORAL
Refills: 0 | Status: COMPLETED | COMMUNITY
End: 2021-04-16

## 2021-04-16 NOTE — HISTORY OF PRESENT ILLNESS
[FreeTextEntry1] : AURELIA BERRY, a 46 year old male, presented to the office for his post hospitalization visit. \par \par Stent placed in L kidney.\par \par Urinating much better. \par \par Erections are normal.\par \par No hematuria\par \par Nocturia diminishing - now only 2x was 7x a night.\par \par R testicle occasional pain - moves up during cold weather. Pain when ambulating. \par \par Stone first in kidneys in 2018 noticed \par never pased stone \par \par stent march 24 on the left \par

## 2021-04-16 NOTE — PHYSICAL EXAM
[General Appearance - Well Developed] : well developed [General Appearance - Well Nourished] : well nourished [Normal Appearance] : normal appearance [Well Groomed] : well groomed [General Appearance - In No Acute Distress] : no acute distress [Edema] : no peripheral edema [Respiration, Rhythm And Depth] : normal respiratory rhythm and effort [Exaggerated Use Of Accessory Muscles For Inspiration] : no accessory muscle use [Abdomen Soft] : soft [Abdomen Tenderness] : non-tender [Costovertebral Angle Tenderness] : no ~M costovertebral angle tenderness [Normal Station and Gait] : the gait and station were normal for the patient's age [] : no rash [No Focal Deficits] : no focal deficits [Oriented To Time, Place, And Person] : oriented to person, place, and time [Affect] : the affect was normal [Mood] : the mood was normal [Not Anxious] : not anxious [No Palpable Adenopathy] : no palpable adenopathy [Urethral Meatus] : meatus normal [Urinary Bladder Findings] : the bladder was normal on palpation [Scrotum] : the scrotum was normal [Testes Mass (___cm)] : there were no testicular masses [FreeTextEntry1] : Right retractile testis, atrophy, epididymis anteriorly

## 2021-04-16 NOTE — ASSESSMENT
[FreeTextEntry1] : S/P L kidney stent placement\par 6 mm stone in mid ureter\par didn't pass stone\par \par Patient also has stone on the right side in the kidney but he is not symptomatic.\par \par We will schedule him for left ureteroscopy with laser lithotripsy and removal of stone fragments.  I will try to do it next week on Wednesday.\par \par Risks benefits and alternatives were explained to the patient.  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  I will schedule him for bilateral orchiopexy during separate procedure.\par \par He is aware that if he has sudden pain, swelling of the testicle nausea and vomiting he needs to come to my office or go to the ER.\par \par We will also obtain scrotal ultrasound today to better definitively delineate his anatomy because he is a bit difficult to examine.\par \par total time 65 min \par \par Taking Flomax 0.4mg at night\par \par Feeling better. \par \par R retractile testicle.

## 2021-04-16 NOTE — REVIEW OF SYSTEMS
[Eyesight Problems] : eyesight problems [History of kidney stones] : history of kidney stones [Negative] : Heme/Lymph [see HPI] : see HPI

## 2021-04-22 ENCOUNTER — OUTPATIENT (OUTPATIENT)
Dept: OUTPATIENT SERVICES | Facility: HOSPITAL | Age: 46
LOS: 1 days | End: 2021-04-22
Payer: COMMERCIAL

## 2021-04-22 VITALS
DIASTOLIC BLOOD PRESSURE: 91 MMHG | HEART RATE: 84 BPM | SYSTOLIC BLOOD PRESSURE: 123 MMHG | RESPIRATION RATE: 20 BRPM | WEIGHT: 175.93 LBS | TEMPERATURE: 97 F | OXYGEN SATURATION: 97 % | HEIGHT: 62 IN

## 2021-04-22 DIAGNOSIS — Z01.818 ENCOUNTER FOR OTHER PREPROCEDURAL EXAMINATION: ICD-10-CM

## 2021-04-22 DIAGNOSIS — N20.0 CALCULUS OF KIDNEY: ICD-10-CM

## 2021-04-22 DIAGNOSIS — Z98.890 OTHER SPECIFIED POSTPROCEDURAL STATES: Chronic | ICD-10-CM

## 2021-04-22 PROCEDURE — 87086 URINE CULTURE/COLONY COUNT: CPT

## 2021-04-22 PROCEDURE — G0463: CPT

## 2021-04-22 RX ORDER — LIDOCAINE HCL 20 MG/ML
0.2 VIAL (ML) INJECTION ONCE
Refills: 0 | Status: DISCONTINUED | OUTPATIENT
Start: 2021-04-29 | End: 2021-05-13

## 2021-04-22 RX ORDER — POLYETHYLENE GLYCOL 3350 17 G/17G
1 POWDER, FOR SOLUTION ORAL
Qty: 0 | Refills: 0 | DISCHARGE

## 2021-04-22 RX ORDER — SODIUM CHLORIDE 9 MG/ML
3 INJECTION INTRAMUSCULAR; INTRAVENOUS; SUBCUTANEOUS EVERY 8 HOURS
Refills: 0 | Status: DISCONTINUED | OUTPATIENT
Start: 2021-04-29 | End: 2021-05-13

## 2021-04-22 NOTE — H&P PST ADULT - NSICDXPROBLEM_GEN_ALL_CORE_FT
PROBLEM DIAGNOSES  Problem: Kidney stone  Assessment and Plan:  Cystoscopy , Left ureteroscopy, Laser lithotripsy, Removal of stone fragments, Exchange possible removal of left ureteral stent on 4/29/2021.

## 2021-04-22 NOTE — H&P PST ADULT - HISTORY OF PRESENT ILLNESS
46 year old male with history of Kidney stones, with left renal colic last month -s/p cystoscopy with left ureteral stent placement 3/24/2021, Now coming in for Cystoscopy , Left ureteroscopy, Laser lithotripsy, Removal of stone fragments, Exchange possible removal of left ureteral stent on 4/29/2021.     Denies Recent travel, Exposure or Covid symptoms  Covid test - 4/26/2021

## 2021-04-23 LAB
CULTURE RESULTS: SIGNIFICANT CHANGE UP
SPECIMEN SOURCE: SIGNIFICANT CHANGE UP

## 2021-04-26 ENCOUNTER — OUTPATIENT (OUTPATIENT)
Dept: OUTPATIENT SERVICES | Facility: HOSPITAL | Age: 46
LOS: 1 days | End: 2021-04-26
Payer: COMMERCIAL

## 2021-04-26 DIAGNOSIS — Z11.52 ENCOUNTER FOR SCREENING FOR COVID-19: ICD-10-CM

## 2021-04-26 DIAGNOSIS — Z98.890 OTHER SPECIFIED POSTPROCEDURAL STATES: Chronic | ICD-10-CM

## 2021-04-26 LAB — SARS-COV-2 RNA SPEC QL NAA+PROBE: SIGNIFICANT CHANGE UP

## 2021-04-26 PROCEDURE — U0003: CPT

## 2021-04-26 PROCEDURE — C9803: CPT

## 2021-04-26 PROCEDURE — U0005: CPT

## 2021-04-28 ENCOUNTER — TRANSCRIPTION ENCOUNTER (OUTPATIENT)
Age: 46
End: 2021-04-28

## 2021-04-29 ENCOUNTER — APPOINTMENT (OUTPATIENT)
Dept: UROLOGY | Facility: HOSPITAL | Age: 46
End: 2021-04-29

## 2021-04-29 ENCOUNTER — RESULT REVIEW (OUTPATIENT)
Age: 46
End: 2021-04-29

## 2021-04-29 ENCOUNTER — OUTPATIENT (OUTPATIENT)
Dept: OUTPATIENT SERVICES | Facility: HOSPITAL | Age: 46
LOS: 1 days | End: 2021-04-29
Payer: COMMERCIAL

## 2021-04-29 VITALS
HEART RATE: 77 BPM | OXYGEN SATURATION: 98 % | TEMPERATURE: 97 F | RESPIRATION RATE: 15 BRPM | DIASTOLIC BLOOD PRESSURE: 77 MMHG | SYSTOLIC BLOOD PRESSURE: 133 MMHG

## 2021-04-29 VITALS
HEIGHT: 62 IN | OXYGEN SATURATION: 99 % | TEMPERATURE: 98 F | HEART RATE: 71 BPM | SYSTOLIC BLOOD PRESSURE: 143 MMHG | WEIGHT: 175.93 LBS | RESPIRATION RATE: 18 BRPM | DIASTOLIC BLOOD PRESSURE: 91 MMHG

## 2021-04-29 DIAGNOSIS — Z98.890 OTHER SPECIFIED POSTPROCEDURAL STATES: Chronic | ICD-10-CM

## 2021-04-29 DIAGNOSIS — N20.0 CALCULUS OF KIDNEY: ICD-10-CM

## 2021-04-29 PROCEDURE — 88300 SURGICAL PATH GROSS: CPT | Mod: 26

## 2021-04-29 PROCEDURE — C1769: CPT

## 2021-04-29 PROCEDURE — 88300 SURGICAL PATH GROSS: CPT

## 2021-04-29 PROCEDURE — 74420 UROGRAPHY RTRGR +-KUB: CPT | Mod: 26,LT

## 2021-04-29 PROCEDURE — 82365 CALCULUS SPECTROSCOPY: CPT

## 2021-04-29 PROCEDURE — C1758: CPT

## 2021-04-29 PROCEDURE — C1889: CPT

## 2021-04-29 PROCEDURE — 76000 FLUOROSCOPY <1 HR PHYS/QHP: CPT

## 2021-04-29 PROCEDURE — 52353 CYSTOURETERO W/LITHOTRIPSY: CPT | Mod: LT

## 2021-04-29 RX ORDER — APREPITANT 80 MG/1
40 CAPSULE ORAL ONCE
Refills: 0 | Status: COMPLETED | OUTPATIENT
Start: 2021-04-29 | End: 2021-04-29

## 2021-04-29 RX ORDER — HYDROMORPHONE HYDROCHLORIDE 2 MG/ML
0.25 INJECTION INTRAMUSCULAR; INTRAVENOUS; SUBCUTANEOUS
Refills: 0 | Status: DISCONTINUED | OUTPATIENT
Start: 2021-04-29 | End: 2021-04-29

## 2021-04-29 RX ORDER — SODIUM CHLORIDE 9 MG/ML
1000 INJECTION INTRAMUSCULAR; INTRAVENOUS; SUBCUTANEOUS
Refills: 0 | Status: DISCONTINUED | OUTPATIENT
Start: 2021-04-29 | End: 2021-05-13

## 2021-04-29 RX ORDER — DIPHENHYDRAMINE HCL 50 MG
12.5 CAPSULE ORAL ONCE
Refills: 0 | Status: DISCONTINUED | OUTPATIENT
Start: 2021-04-29 | End: 2021-05-13

## 2021-04-29 RX ORDER — SODIUM CHLORIDE 9 MG/ML
1000 INJECTION, SOLUTION INTRAVENOUS
Refills: 0 | Status: DISCONTINUED | OUTPATIENT
Start: 2021-04-29 | End: 2021-05-13

## 2021-04-29 RX ORDER — OXYCODONE HYDROCHLORIDE 5 MG/1
5 TABLET ORAL ONCE
Refills: 0 | Status: DISCONTINUED | OUTPATIENT
Start: 2021-04-29 | End: 2021-04-29

## 2021-04-29 RX ORDER — CIPROFLOXACIN LACTATE 400MG/40ML
400 VIAL (ML) INTRAVENOUS ONCE
Refills: 0 | Status: COMPLETED | OUTPATIENT
Start: 2021-04-29 | End: 2021-04-29

## 2021-04-29 RX ORDER — ONDANSETRON 8 MG/1
4 TABLET, FILM COATED ORAL ONCE
Refills: 0 | Status: DISCONTINUED | OUTPATIENT
Start: 2021-04-29 | End: 2021-05-13

## 2021-04-29 RX ADMIN — APREPITANT 40 MILLIGRAM(S): 80 CAPSULE ORAL at 10:20

## 2021-04-29 NOTE — ASU DISCHARGE PLAN (ADULT/PEDIATRIC) - ASU DC SPECIAL INSTRUCTIONSFT
1. Take tylenol and motrin as needed for pain. Take prescribed medication for breakthrough pain.  2. Follow up with Dr. Thomas as an outpatient.

## 2021-04-29 NOTE — ASU DISCHARGE PLAN (ADULT/PEDIATRIC) - CARE PROVIDER_API CALL
Jaron Thomas; PhD)  Urology  [address] [facility], [facility] [address]  [place] [address]  Phone: [phone]  Fax: [phone]  Follow Up Time:

## 2021-04-29 NOTE — PRE-ANESTHESIA EVALUATION ADULT - NSANTHPMHFT_GEN_ALL_CORE
46 year old male with history of Kidney stones, with left renal colic last month -s/p cystoscopy with left ureteral stent placement 3/24/2021, Now coming in for Cystoscopy , Left ureteroscopy, Laser lithotripsy, Removal of stone fragments, Exchange possible removal of left ureteral stent on 4/29/2021.

## 2021-04-30 ENCOUNTER — NON-APPOINTMENT (OUTPATIENT)
Age: 46
End: 2021-04-30

## 2021-04-30 PROBLEM — N20.0 CALCULUS OF KIDNEY: Chronic | Status: ACTIVE | Noted: 2021-04-22

## 2021-05-04 LAB — NIDUS STONE QN: SIGNIFICANT CHANGE UP

## 2021-05-05 LAB — SURGICAL PATHOLOGY STUDY: SIGNIFICANT CHANGE UP

## 2021-05-17 LAB
25(OH)D3 SERPL-MCNC: 25.7 NG/ML
ALBUMIN SERPL ELPH-MCNC: 5.3 G/DL
ALP BLD-CCNC: 129 U/L
ALT SERPL-CCNC: 70 U/L
ANION GAP SERPL CALC-SCNC: 15 MMOL/L
AST SERPL-CCNC: 36 U/L
BASOPHILS # BLD AUTO: 0.04 K/UL
BASOPHILS NFR BLD AUTO: 0.7 %
BILIRUB SERPL-MCNC: 0.6 MG/DL
BUN SERPL-MCNC: 16 MG/DL
CALCIUM SERPL-MCNC: 10.3 MG/DL
CALCIUM SERPL-MCNC: 10.3 MG/DL
CHLORIDE SERPL-SCNC: 101 MMOL/L
CO2 SERPL-SCNC: 25 MMOL/L
CREAT SERPL-MCNC: 1.07 MG/DL
EOSINOPHIL # BLD AUTO: 0.32 K/UL
EOSINOPHIL NFR BLD AUTO: 5.4 %
GLUCOSE SERPL-MCNC: 92 MG/DL
HCT VFR BLD CALC: 51.9 %
HGB BLD-MCNC: 16.7 G/DL
IMM GRANULOCYTES NFR BLD AUTO: 0.3 %
LYMPHOCYTES # BLD AUTO: 2.11 K/UL
LYMPHOCYTES NFR BLD AUTO: 35.4 %
MAN DIFF?: NORMAL
MCHC RBC-ENTMCNC: 29.9 PG
MCHC RBC-ENTMCNC: 32.2 GM/DL
MCV RBC AUTO: 93 FL
MONOCYTES # BLD AUTO: 0.41 K/UL
MONOCYTES NFR BLD AUTO: 6.9 %
NEUTROPHILS # BLD AUTO: 3.06 K/UL
NEUTROPHILS NFR BLD AUTO: 51.3 %
PARATHYROID HORMONE INTACT: 58 PG/ML
PLATELET # BLD AUTO: 339 K/UL
POTASSIUM SERPL-SCNC: 5 MMOL/L
PROT SERPL-MCNC: 8 G/DL
RBC # BLD: 5.58 M/UL
RBC # FLD: 12.5 %
SODIUM SERPL-SCNC: 141 MMOL/L
URATE SERPL-MCNC: 7.6 MG/DL
WBC # FLD AUTO: 5.96 K/UL

## 2021-05-28 ENCOUNTER — APPOINTMENT (OUTPATIENT)
Dept: UROLOGY | Facility: CLINIC | Age: 46
End: 2021-05-28
Payer: COMMERCIAL

## 2021-05-28 VITALS
SYSTOLIC BLOOD PRESSURE: 141 MMHG | HEART RATE: 62 BPM | DIASTOLIC BLOOD PRESSURE: 87 MMHG | HEIGHT: 62 IN | WEIGHT: 175 LBS | BODY MASS INDEX: 32.2 KG/M2 | TEMPERATURE: 97.8 F

## 2021-05-28 PROCEDURE — 99212 OFFICE O/P EST SF 10 MIN: CPT

## 2021-09-28 ENCOUNTER — APPOINTMENT (OUTPATIENT)
Dept: UROLOGY | Facility: CLINIC | Age: 46
End: 2021-09-28
Payer: COMMERCIAL

## 2021-09-28 VITALS
WEIGHT: 160 LBS | HEIGHT: 62 IN | DIASTOLIC BLOOD PRESSURE: 83 MMHG | RESPIRATION RATE: 16 BRPM | OXYGEN SATURATION: 98 % | HEART RATE: 71 BPM | BODY MASS INDEX: 29.44 KG/M2 | SYSTOLIC BLOOD PRESSURE: 136 MMHG

## 2021-09-28 DIAGNOSIS — Z91.89 OTHER SPECIFIED PERSONAL RISK FACTORS, NOT ELSEWHERE CLASSIFIED: ICD-10-CM

## 2021-09-28 DIAGNOSIS — G89.18 OTHER ACUTE POSTPROCEDURAL PAIN: ICD-10-CM

## 2021-09-28 PROCEDURE — 93976 VASCULAR STUDY: CPT

## 2021-09-28 PROCEDURE — 76775 US EXAM ABDO BACK WALL LIM: CPT | Mod: 79

## 2021-09-28 PROCEDURE — 51798 US URINE CAPACITY MEASURE: CPT

## 2021-09-28 PROCEDURE — 99214 OFFICE O/P EST MOD 30 MIN: CPT

## 2021-09-28 PROCEDURE — 51741 ELECTRO-UROFLOWMETRY FIRST: CPT

## 2021-09-28 RX ORDER — HYDROCODONE BITARTRATE AND ACETAMINOPHEN 5; 325 MG/1; MG/1
5-325 TABLET ORAL
Qty: 28 | Refills: 0 | Status: COMPLETED | COMMUNITY
Start: 2021-04-28 | End: 2021-09-28

## 2021-09-28 RX ORDER — CELECOXIB 200 MG/1
200 CAPSULE ORAL
Qty: 10 | Refills: 0 | Status: COMPLETED | COMMUNITY
Start: 2021-04-28 | End: 2021-09-28

## 2021-09-28 RX ORDER — DOCUSATE SODIUM 100 MG/1
100 CAPSULE ORAL TWICE DAILY
Qty: 1 | Refills: 0 | Status: COMPLETED | COMMUNITY
Start: 2021-04-28 | End: 2021-09-28

## 2021-09-28 RX ORDER — TAMSULOSIN HYDROCHLORIDE 0.4 MG/1
CAPSULE ORAL
Refills: 0 | Status: COMPLETED | COMMUNITY
End: 2021-09-28

## 2021-09-28 NOTE — ASSESSMENT
[FreeTextEntry1] : Weak stream\par -uroflow/pvr\par - normal flow - pvr: 5cc\par \par Palpable bilateral varicocele - confirmed on last U/S. \par \par smaller testes \par has kids \par no pain \par no need for surgical repair unless T low or more symptom \par \par hx of ureteral stone \par reviewed stone analysis - low citrate \par \par dietary modifications discussed increase citrate\par \par Check labs -CBC, CMP, U/A, FRET\par \par The submitted E/M billing level for this visit reflects the total time spent on the day of the visit including face-to-face time spent with the patient, non-face-to-face review of medical records and relevant information, documentation, and asynchronous communication with the patient after a visit via phone, email, or patient’s eHR portal after the visit.  \par \par The medical records reviewed are either scanned into the chart or reviewed with the patient using a patient’s electronic medical records portal for patients with records not available to [facility] via electronic transmission platforms from other institutions and labs. \par \par Time spend counseling and performing coordination of care was also included in determining the appropriate EM billing level.\par \par I have reviewed and verified information regarding the chief complaint and history recorded by the ancillary staff and/or the patient. I have independently reviewed and interpreted tests performed by other physicians and facilities as necessary. \par \par I have discussed with the patient differential diagnosis, reason for auxiliary tests if ordered, risks, benefits, alternatives, and complications of each form of therapy were discussed. \par \par I saw and evaluated the patient with nurse practitioner Tyson Amin. \par I have confirmed the chief complaint, past medical, surgical, and social history.\par I have examined the patient. \par I have reviewed the note and interpreted auxiliary tests results. \par I have formulated the assessment and plan and discussed my recommendations of care to the nurse practitioner.\par I agree with the findings and the plan of care as documented by this  note which I edited as necessary.\par

## 2021-09-28 NOTE — PHYSICAL EXAM

## 2021-09-28 NOTE — HISTORY OF PRESENT ILLNESS
[FreeTextEntry1] : AURELIA BERRY, a 46 year old male, presented to the office for a follow up since having renal stone removal followed by ureteral stent removal \par \par Sx date - 3/24/21\par \par Still rare episodes of L-sided flank pain.\par - major difference since sx\par \par Feels urinary stream weaker than before. \par \par no scrotal pain \par \par normal erections

## 2021-09-29 LAB
ALBUMIN SERPL ELPH-MCNC: 4.6 G/DL
ALP BLD-CCNC: 102 U/L
ALT SERPL-CCNC: 36 U/L
ANION GAP SERPL CALC-SCNC: 13 MMOL/L
APPEARANCE: CLEAR
AST SERPL-CCNC: 25 U/L
BACTERIA: NEGATIVE
BASOPHILS # BLD AUTO: 0.02 K/UL
BASOPHILS NFR BLD AUTO: 0.4 %
BILIRUB SERPL-MCNC: 0.5 MG/DL
BILIRUBIN URINE: NEGATIVE
BLOOD URINE: NEGATIVE
BUN SERPL-MCNC: 16 MG/DL
CALCIUM SERPL-MCNC: 9.6 MG/DL
CHLORIDE SERPL-SCNC: 103 MMOL/L
CO2 SERPL-SCNC: 25 MMOL/L
COLOR: NORMAL
CREAT SERPL-MCNC: 1.24 MG/DL
EOSINOPHIL # BLD AUTO: 0.21 K/UL
EOSINOPHIL NFR BLD AUTO: 4.4 %
GLUCOSE QUALITATIVE U: NEGATIVE
GLUCOSE SERPL-MCNC: 91 MG/DL
HCT VFR BLD CALC: 48.6 %
HGB BLD-MCNC: 16 G/DL
HYALINE CASTS: 0 /LPF
IMM GRANULOCYTES NFR BLD AUTO: 0.2 %
KETONES URINE: NEGATIVE
LEUKOCYTE ESTERASE URINE: NEGATIVE
LYMPHOCYTES # BLD AUTO: 1.75 K/UL
LYMPHOCYTES NFR BLD AUTO: 36.5 %
MAN DIFF?: NORMAL
MCHC RBC-ENTMCNC: 30.8 PG
MCHC RBC-ENTMCNC: 32.9 GM/DL
MCV RBC AUTO: 93.5 FL
MICROSCOPIC-UA: NORMAL
MONOCYTES # BLD AUTO: 0.45 K/UL
MONOCYTES NFR BLD AUTO: 9.4 %
NEUTROPHILS # BLD AUTO: 2.35 K/UL
NEUTROPHILS NFR BLD AUTO: 49.1 %
NITRITE URINE: NEGATIVE
PH URINE: 6
PLATELET # BLD AUTO: 303 K/UL
POTASSIUM SERPL-SCNC: 5.1 MMOL/L
PROT SERPL-MCNC: 7 G/DL
PROTEIN URINE: NEGATIVE
RBC # BLD: 5.2 M/UL
RBC # FLD: 12.3 %
RED BLOOD CELLS URINE: 1 /HPF
SODIUM SERPL-SCNC: 141 MMOL/L
SPECIFIC GRAVITY URINE: 1.02
SQUAMOUS EPITHELIAL CELLS: 0 /HPF
UROBILINOGEN URINE: NORMAL
WBC # FLD AUTO: 4.79 K/UL
WHITE BLOOD CELLS URINE: 0 /HPF

## 2021-10-01 LAB
TESTOST BND SERPL-MCNC: 8 PG/ML
TESTOSTERONE TOTAL S: 634 NG/DL

## 2021-10-18 NOTE — HISTORY OF PRESENT ILLNESS
[FreeTextEntry1] : Here for follow up after lithotripsy and stone removal \par no pain \par \par \par has retractile testis bothered on the left side

## 2021-10-18 NOTE — PHYSICAL EXAM
[Urethral Meatus] : meatus normal [Urinary Bladder Findings] : the bladder was normal on palpation [Scrotum] : the scrotum was normal [Testes Mass (___cm)] : there were no testicular masses [FreeTextEntry1] : left retractile testis

## 2021-10-18 NOTE — ASSESSMENT
[FreeTextEntry1] : S/p stone removal \par \par follow up in 3 months with US \par \par retractile testis with pain on left \par \par schedule bilateral orchiopexy if continues to have issues \par \par

## 2021-11-19 NOTE — H&P PST ADULT - PRO ARRIVE FROM
CARDIAC CATHETERIZATION REPORT



DATE OF PROCEDURE:

11/19/2021



INDICATION:

Unstable angina.



This is a 59-year-old gentleman with history of coronary artery disease, status post

angioplasty of right coronary artery in 2018, who presented to me with symptoms of

unstable angina.  He has had recurrent episodes of chest pain and required sublingual

nitroglycerin.  Due to this, I advised him to undergo cardiac catheterization, and he

was explained risks, benefits and alternatives, understood and accepted.



PROCEDURE NOTE:

After obtaining informed consent, left heart catheterization and coronary angiogram

were performed via the right radial artery using standard Katy catheters.  Size 4

left Katy and a size 4 right Katy were used to engage the right and left

coronaries.  The radial artery access was obtained using standard technique. Patient

was given verapamil and 4000 units of intravenous heparin as per protocol.  The

catheters were manipulated into the ascending aorta under fluoroscopic guidance. He

tolerated the procedure well without any obvious immediate complications.  He received

conscious sedation with Versed and fentanyl.  Total sedation time was 18 minutes.



FINDINGS:

HEMODYNAMICS: Left ventricular end-diastolic pressure is 19 mm.  There is no

significant gradient across the aortic valve.



LEFT VENTRICULOGRAM: Left ventriculogram was not performed.



ANGIOGRAPHIC DATA:



Right coronary artery. Right coronary artery is a large dominant vessel.  The

previously stented segment within the mid right coronary artery appears patent.



Left main coronary artery. Left main coronary artery is a normal-sized vessel and is

free of stenosis.  It divides into left anterior descending coronary artery and

circumflex coronary artery. Circumflex coronary artery and its branches are free of

significant stenosis. LAD shows an 80% stenosis in the proximal part. This is a

significant progression compared to an atherosclerotic plaque noted in that area on the

previous angiogram.



CONCLUSIONS:

Stenosis of 80% involving proximal LAD and patent stent within the mid RCA.



PLAN:

Angiographic data was reviewed by Dr. TEX Mcclure, the interventionist, who performed

his right coronary intervention in the past, and he will perform angioplasty of the

proximal LAD.





MMODL / IJN: [id number] / Job#: [id number] home

## 2022-02-07 ENCOUNTER — APPOINTMENT (OUTPATIENT)
Dept: DERMATOLOGY | Facility: CLINIC | Age: 47
End: 2022-02-07

## 2022-03-28 ENCOUNTER — APPOINTMENT (OUTPATIENT)
Dept: UROLOGY | Facility: CLINIC | Age: 47
End: 2022-03-28
Payer: COMMERCIAL

## 2022-03-28 DIAGNOSIS — N13.2 HYDRONEPHROSIS WITH RENAL AND URETERAL CALCULOUS OBSTRUCTION: ICD-10-CM

## 2022-03-28 PROCEDURE — 76775 US EXAM ABDO BACK WALL LIM: CPT | Mod: 59

## 2022-03-28 PROCEDURE — 99215 OFFICE O/P EST HI 40 MIN: CPT

## 2022-03-28 PROCEDURE — 93976 VASCULAR STUDY: CPT | Mod: 59

## 2022-03-28 NOTE — ASSESSMENT
[FreeTextEntry1] : Status post left ureteroscopy with left laser lithotripsy last year.  He continues to have mild hydronephrosis on the ultrasound on the left side.\par \par I will order CT urogram to better assess the hydronephrosis and to identify if there is any transition zone in the ureter of the stricture.  If the findings are confirmed on the CT scan then we will consider nuclear medicine study.  He will see me in 2 to 3 months to discuss next steps.  Otherwise if there is no significant obstruction then we will follow with ultrasound in 6 months.\par \par I have reviewed his labs as well \par All normal \par T is excellent \par \par He has some hesitancy but does not want to start any meds\par \par will send PSA if done by PCP \par \par The submitted E/M billing level for this visit reflects the total time spent on the day of the visit including documentation in EMR, face-to-face time spent with the patient, non-face-to-face review of medical records and relevant information, review of laboratory results available via [facility] portal, as well using a patient’s electronic medical records portal for patients with records not available to [facility] directly. \par \par Time spend counseling and performing coordination of care was also included in determining the appropriate EM billing level.\par \par I have reviewed and verified information regarding the chief complaint and history recorded by the ancillary staff and/or the patient. I have independently reviewed and interpreted tests performed by other physicians and facilities as necessary. I have reviewed images pertinent to providing the care to  the patient.  \par \par I have discussed with the patient differential diagnosis, reason for auxiliary tests if ordered, risks, benefits, alternatives, and complications of each form of therapy included surgical therapy. Off label use of most of medications used in andrology was reviewed. \par \par \par total time 42 min discussing findings, reviewing labs, ordering CT urogram \par

## 2022-03-28 NOTE — HISTORY OF PRESENT ILLNESS
[FreeTextEntry1] : Here for the follow up of stone on the left side \maxx had laser lithotripsy last year and removal of stone.\par \par This is a follow-up visit.  He has no pain.\par \par We reviewed his stone analysis results.  He already increased intake of water and drinks water with half of lemon.\par \par He had right nonobstructive stone on the right side on the previous CT scan.\par \par

## 2022-04-18 NOTE — ED PROVIDER NOTE - NS ED NOTE AC HIGH RISK COUNTRIES
[place] Posterior Auricular Interpolation Flap Text: We discussed various closure modalities with the patient, including healing by second intention, primary closure, skin graft and various flaps.  The location and configuration of the defect indicated that a post-auricular interpolation flap would result in the least disturbance of the position and function of the surrounding anatomic structures, and provide the best result.  The technique, its benefits, alternatives and risks were discussed with the patient.  The patient underwent the procedure as follows: The patient was positioned supine on the operating room table.  The area of the defect and the surrounding skin were anesthetized with buffered 1% lidocaine with epinephrine.  The area was washed with chlorhexidine.  Sterile drapes were applied. \\n\\nA decision was made to reconstruct the defect utilizing an interpolation axial flap and a staged reconstruction.  A telfa template was made of the defect.  This telfa template was then used to outline the posterior auricular interpolation flap.  The donor area for the pedicle flap was then injected with anesthesia.  The flap was excised through the skin and subcutaneous tissue down to the layer of the underlying musculature.  The pedicle flap was carefully excised within this deep plane to maintain its blood supply.  The edges of the donor site were undermined.   The donor site was closed in a primary fashion.  The pedicle was then rotated into position and sutured.  Once the tube was sutured into place, adequate blood supply was confirmed with blanching and refill.  The pedicle was then wrapped with xeroform gauze and dressed appropriately with a telfa and gauze bandage to ensure continued blood supply and protect the attached pedicle.  The second stage of the flap (takedown) would occur after sufficient blood supply has been established, at about 3 weeks.

## 2022-06-14 ENCOUNTER — EMERGENCY (EMERGENCY)
Facility: HOSPITAL | Age: 47
LOS: 1 days | Discharge: HOME | End: 2022-06-14
Admitting: EMERGENCY MEDICINE
Payer: COMMERCIAL

## 2022-06-14 VITALS
OXYGEN SATURATION: 100 % | RESPIRATION RATE: 15 BRPM | HEART RATE: 80 BPM | HEIGHT: 62 IN | DIASTOLIC BLOOD PRESSURE: 81 MMHG | SYSTOLIC BLOOD PRESSURE: 146 MMHG | TEMPERATURE: 97 F

## 2022-06-14 DIAGNOSIS — Z98.890 OTHER SPECIFIED POSTPROCEDURAL STATES: Chronic | ICD-10-CM

## 2022-06-14 PROCEDURE — 99285 EMERGENCY DEPT VISIT HI MDM: CPT | Mod: 25

## 2022-06-14 PROCEDURE — 93010 ELECTROCARDIOGRAM REPORT: CPT

## 2022-06-14 NOTE — ED ADULT TRIAGE NOTE - CHIEF COMPLAINT QUOTE
Pt. c/o of intermittent, chest pain x 4 days, pain is described as " needle sticking me". Endorses sob, burning sensation on b/l arms and legs. Denies n/v, blurry vision, dizziness. PMHx; none

## 2022-06-15 VITALS
SYSTOLIC BLOOD PRESSURE: 113 MMHG | HEART RATE: 65 BPM | RESPIRATION RATE: 18 BRPM | OXYGEN SATURATION: 99 % | DIASTOLIC BLOOD PRESSURE: 77 MMHG | TEMPERATURE: 98 F

## 2022-06-15 LAB
ALBUMIN SERPL ELPH-MCNC: 4.5 G/DL — SIGNIFICANT CHANGE UP (ref 3.3–5)
ALP SERPL-CCNC: 107 U/L — SIGNIFICANT CHANGE UP (ref 40–120)
ALT FLD-CCNC: 51 U/L — HIGH (ref 4–41)
ANION GAP SERPL CALC-SCNC: 13 MMOL/L — SIGNIFICANT CHANGE UP (ref 7–14)
APTT BLD: 33.6 SEC — SIGNIFICANT CHANGE UP (ref 27–36.3)
APTT BLD: 60.9 SEC — HIGH (ref 27–36.3)
AST SERPL-CCNC: 31 U/L — SIGNIFICANT CHANGE UP (ref 4–40)
BASOPHILS # BLD AUTO: 0.02 K/UL — SIGNIFICANT CHANGE UP (ref 0–0.2)
BASOPHILS NFR BLD AUTO: 0.3 % — SIGNIFICANT CHANGE UP (ref 0–2)
BILIRUB SERPL-MCNC: 0.7 MG/DL — SIGNIFICANT CHANGE UP (ref 0.2–1.2)
BLOOD GAS VENOUS COMPREHENSIVE RESULT: SIGNIFICANT CHANGE UP
BLOOD GAS VENOUS COMPREHENSIVE RESULT: SIGNIFICANT CHANGE UP
BUN SERPL-MCNC: 13 MG/DL — SIGNIFICANT CHANGE UP (ref 7–23)
CALCIUM SERPL-MCNC: 9.4 MG/DL — SIGNIFICANT CHANGE UP (ref 8.4–10.5)
CHLORIDE SERPL-SCNC: 103 MMOL/L — SIGNIFICANT CHANGE UP (ref 98–107)
CO2 SERPL-SCNC: 21 MMOL/L — LOW (ref 22–31)
CREAT SERPL-MCNC: 0.98 MG/DL — SIGNIFICANT CHANGE UP (ref 0.5–1.3)
D DIMER BLD IA.RAPID-MCNC: <150 NG/ML DDU — SIGNIFICANT CHANGE UP
EGFR: 96 ML/MIN/1.73M2 — SIGNIFICANT CHANGE UP
EOSINOPHIL # BLD AUTO: 0.13 K/UL — SIGNIFICANT CHANGE UP (ref 0–0.5)
EOSINOPHIL NFR BLD AUTO: 1.9 % — SIGNIFICANT CHANGE UP (ref 0–6)
GLUCOSE SERPL-MCNC: 95 MG/DL — SIGNIFICANT CHANGE UP (ref 70–99)
HCT VFR BLD CALC: 52.1 % — HIGH (ref 39–50)
HGB BLD-MCNC: 16.6 G/DL — SIGNIFICANT CHANGE UP (ref 13–17)
IANC: 3.76 K/UL — SIGNIFICANT CHANGE UP (ref 1.8–7.4)
IMM GRANULOCYTES NFR BLD AUTO: 0.1 % — SIGNIFICANT CHANGE UP (ref 0–1.5)
INR BLD: 1.09 RATIO — SIGNIFICANT CHANGE UP (ref 0.88–1.16)
INR BLD: 1.09 RATIO — SIGNIFICANT CHANGE UP (ref 0.88–1.16)
LIDOCAIN IGE QN: 41 U/L — SIGNIFICANT CHANGE UP (ref 7–60)
LYMPHOCYTES # BLD AUTO: 2.26 K/UL — SIGNIFICANT CHANGE UP (ref 1–3.3)
LYMPHOCYTES # BLD AUTO: 33.4 % — SIGNIFICANT CHANGE UP (ref 13–44)
MCHC RBC-ENTMCNC: 30.3 PG — SIGNIFICANT CHANGE UP (ref 27–34)
MCHC RBC-ENTMCNC: 31.9 GM/DL — LOW (ref 32–36)
MCV RBC AUTO: 95.2 FL — SIGNIFICANT CHANGE UP (ref 80–100)
MONOCYTES # BLD AUTO: 0.59 K/UL — SIGNIFICANT CHANGE UP (ref 0–0.9)
MONOCYTES NFR BLD AUTO: 8.7 % — SIGNIFICANT CHANGE UP (ref 2–14)
NEUTROPHILS # BLD AUTO: 3.76 K/UL — SIGNIFICANT CHANGE UP (ref 1.8–7.4)
NEUTROPHILS NFR BLD AUTO: 55.6 % — SIGNIFICANT CHANGE UP (ref 43–77)
NRBC # BLD: 0 /100 WBCS — SIGNIFICANT CHANGE UP
NRBC # FLD: 0 K/UL — SIGNIFICANT CHANGE UP
PLATELET # BLD AUTO: 260 K/UL — SIGNIFICANT CHANGE UP (ref 150–400)
POTASSIUM SERPL-MCNC: 4.2 MMOL/L — SIGNIFICANT CHANGE UP (ref 3.5–5.3)
POTASSIUM SERPL-SCNC: 4.2 MMOL/L — SIGNIFICANT CHANGE UP (ref 3.5–5.3)
PROT SERPL-MCNC: 7.7 G/DL — SIGNIFICANT CHANGE UP (ref 6–8.3)
PROTHROM AB SERPL-ACNC: 12.6 SEC — SIGNIFICANT CHANGE UP (ref 10.5–13.4)
PROTHROM AB SERPL-ACNC: 12.7 SEC — SIGNIFICANT CHANGE UP (ref 10.5–13.4)
RBC # BLD: 5.47 M/UL — SIGNIFICANT CHANGE UP (ref 4.2–5.8)
RBC # FLD: 12 % — SIGNIFICANT CHANGE UP (ref 10.3–14.5)
SODIUM SERPL-SCNC: 137 MMOL/L — SIGNIFICANT CHANGE UP (ref 135–145)
TROPONIN T, HIGH SENSITIVITY RESULT: 6 NG/L — SIGNIFICANT CHANGE UP
TSH SERPL-MCNC: 1.79 UIU/ML — SIGNIFICANT CHANGE UP (ref 0.27–4.2)
WBC # BLD: 6.77 K/UL — SIGNIFICANT CHANGE UP (ref 3.8–10.5)
WBC # FLD AUTO: 6.77 K/UL — SIGNIFICANT CHANGE UP (ref 3.8–10.5)

## 2022-06-15 PROCEDURE — 71046 X-RAY EXAM CHEST 2 VIEWS: CPT | Mod: 26

## 2022-06-15 NOTE — ED PROVIDER NOTE - NSTIMEPROVIDERCAREINITIATE_GEN_ER
Normal vision: sees adequately in most situations; can see medication labels, newsprint 15-Bebeto-2022 00:49

## 2022-06-15 NOTE — ED POST DISCHARGE NOTE - REASON FOR FOLLOW-UP
Telephone follow up  request : Call patient with PTT results. S/W patient's wife and gave results. Discussed with wife patient needs Hematology follow up. Strict return precautions given to patient's wife. Other

## 2022-06-15 NOTE — ED ADULT NURSE NOTE - NSIMPLEMENTINTERV_GEN_ALL_ED
Implemented All Universal Safety Interventions:  [place] to call system. Call bell, personal items and telephone within reach. Instruct patient to call for assistance. Room bathroom lighting operational. Non-slip footwear when patient is off stretcher. Physically safe environment: no spills, clutter or unnecessary equipment. Stretcher in lowest position, wheels locked, appropriate side rails in place.

## 2022-06-15 NOTE — ED PROVIDER NOTE - PROGRESS NOTE DETAILS
TERESA Pichardo: all results discussed with patient and wife at this time. Denies pain or complaints. No events on tele. Elevated pTT, will repeat labs and reassess. TERESA Pichardo: discussed with patient and wife potential causes for elevated PTT, patient not on medications, likely lab delay result. Will repeat labs and have admin PA call to discuss as patient and wife prefer to go home and not wait. Will dc with hematology follow up.

## 2022-06-15 NOTE — ED PROVIDER NOTE - CLINICAL SUMMARY MEDICAL DECISION MAKING FREE TEXT BOX
47y Male with no sig PMHx presents to the ER for chest pain. Reports L sided, intermittent, nonradiating, sharp chest pain that began 4 days ago in addition to intermittent numbness and tingling of bilateral UE/LE and twitching for 3 days. Vital signs stable, exam unremarkable, EKG NSR@76bpm, no focal deficit. Will get labs, CXR, cardiac monitor, reassess.

## 2022-06-15 NOTE — ED PROVIDER NOTE - PATIENT PORTAL LINK FT
You can access the FollowMyHealth Patient Portal offered by [facility] by registering at the following website: [email or web address]. By joining [employer]’s FollowMyHealth portal, you will also be able to view your health information using other applications (apps) compatible with our system.

## 2022-06-15 NOTE — ED ADULT NURSE NOTE - OBJECTIVE STATEMENT
7y Male left sided sharp intermittent chest pain non-radiating, x 4days,  numbness and burning to extremities.  Concerned for an allergic reaction or side effects of medication. Denies fever, headache, hives, acute changes in vision, difficulty breathing, abdominal pain, n/v/d, urinary complaints. 20g placed in right a/c labs sent, NSR on cardiac monitor, will monitor pt.

## 2022-06-15 NOTE — ED PROVIDER NOTE - OBJECTIVE STATEMENT
47y Male with no sig PMHx presents to the ER for chest pain. Patient reports L sided, intermittent, nonradiating, sharp chest pain that began 4 days ago in addition to intermittent numbness and tingling of bilateral UE/LE and twitching for 3 days. Patient states he got his first dose of the covid vaccine on 6/3 and since developed these symptoms. Concerned for an allergic reaction or side effects of medication. Denies fever, headache, hives, acute changes in vision, difficulty breathing, abdominal pain, n/v/d, urinary complaints.

## 2022-06-15 NOTE — ED PROVIDER NOTE - NSFOLLOWUPINSTRUCTIONS_ED_ALL_ED_FT
Today you were seen in the ER for chest pain.     Please see below for a copy of your results.     Chest Pain    Chest pain can be caused by many different conditions which may or may not be dangerous. Causes include heartburn, lung infections, heart attack, blood clot in lungs, skin infections, strain or damage to muscle, cartilage, or bones, etc. In addition to a history and physical examination, an electrocardiogram (ECG) or other lab tests may have been performed to determine the cause of your chest pain. Follow up with your primary care provider or with a cardiologist as instructed.     SEEK IMMEDIATE MEDICAL CARE IF YOU HAVE ANY OF THE FOLLOWING SYMPTOMS: worsening chest pain, coughing up blood, unexplained back/neck/jaw pain, severe abdominal pain, dizziness or lightheadedness, fainting, shortness of breath, sweaty or clammy skin, vomiting, or racing heart beat. These symptoms may represent a serious problem that is an emergency. Do not wait to see if the symptoms will go away. Get medical help right away. Call 911 and do not drive yourself to the hospital.    Advance activity as tolerated.     Continue all previously prescribed medications as directed unless otherwise instructed.     Follow up with your primary care physician in 48-72 hours- bring copies of your results.      A request was placed to assist with hematology follow up; you will receive a call to set up the appointment.

## 2022-06-15 NOTE — ED PROVIDER NOTE - NS ED ROS FT
Constitutional: (-) Fever, (-) Chills  Skin: (-) Color changes, (-) Rashes  Eyes: (-) Visual changes, (-) Discharge, (-) Redness  Ears: (-) Hearing loss, (-)Tinnitus, (-) Ear pain  Nose: (-) Nasal congestion, (-) Runny nose  Mouth/Throat: (-) Sore throat  CV: (+) Chest pain, (-) Palpitations, (-) Diaphoresis, (-) Extremity Swelling, (-) Syncope  Resp: (-) Cough, (-) Shortness of breath  GI: (-) Abdominal pain, (-) Nausea, (-) Vomiting, (-) Diarrhea  : (-) Dysuria, (-) Hematuria, (-) Increased frequency  MSK: (-) Myalgias, (-) Back pain, (-) Neck pain  Neuro: (-) Headache

## 2022-06-15 NOTE — ED ADULT NURSE NOTE - NS ED NURSE IV DC DT
Recovery complete. Patient recovered to baseline. Discharge instructions reviewed with patient. VSS.      15-Bebeto-2022 04:15

## 2022-06-24 ENCOUNTER — NON-APPOINTMENT (OUTPATIENT)
Age: 47
End: 2022-06-24

## 2022-07-08 ENCOUNTER — OUTPATIENT (OUTPATIENT)
Dept: OUTPATIENT SERVICES | Facility: HOSPITAL | Age: 47
LOS: 1 days | Discharge: HOME | End: 2022-07-08

## 2022-07-08 DIAGNOSIS — Z98.890 OTHER SPECIFIED POSTPROCEDURAL STATES: Chronic | ICD-10-CM

## 2022-07-08 DIAGNOSIS — D75.0 FAMILIAL ERYTHROCYTOSIS: ICD-10-CM

## 2022-07-14 ENCOUNTER — RESULT REVIEW (OUTPATIENT)
Age: 47
End: 2022-07-14

## 2022-07-14 ENCOUNTER — NON-APPOINTMENT (OUTPATIENT)
Age: 47
End: 2022-07-14

## 2022-07-14 ENCOUNTER — LABORATORY RESULT (OUTPATIENT)
Age: 47
End: 2022-07-14

## 2022-07-14 ENCOUNTER — APPOINTMENT (OUTPATIENT)
Dept: HEMATOLOGY ONCOLOGY | Facility: CLINIC | Age: 47
End: 2022-07-14

## 2022-07-14 VITALS
TEMPERATURE: 97 F | BODY MASS INDEX: 27.64 KG/M2 | HEART RATE: 74 BPM | HEIGHT: 62.01 IN | OXYGEN SATURATION: 98 % | DIASTOLIC BLOOD PRESSURE: 87 MMHG | WEIGHT: 152.12 LBS | SYSTOLIC BLOOD PRESSURE: 136 MMHG | RESPIRATION RATE: 16 BRPM

## 2022-07-14 DIAGNOSIS — Z78.9 OTHER SPECIFIED HEALTH STATUS: ICD-10-CM

## 2022-07-14 DIAGNOSIS — Z82.49 FAMILY HISTORY OF ISCHEMIC HEART DISEASE AND OTHER DISEASES OF THE CIRCULATORY SYSTEM: ICD-10-CM

## 2022-07-14 DIAGNOSIS — Z83.3 FAMILY HISTORY OF DIABETES MELLITUS: ICD-10-CM

## 2022-07-14 LAB
ALBUMIN SERPL ELPH-MCNC: 4.9 G/DL
ALP BLD-CCNC: 100 U/L
ALT SERPL-CCNC: 49 U/L
ANION GAP SERPL CALC-SCNC: 12 MMOL/L
AST SERPL-CCNC: 26 U/L
BASOPHILS # BLD AUTO: 0 K/UL — SIGNIFICANT CHANGE UP (ref 0–0.2)
BASOPHILS NFR BLD AUTO: 0 % — SIGNIFICANT CHANGE UP (ref 0–2)
BILIRUB SERPL-MCNC: 0.4 MG/DL
BUN SERPL-MCNC: 10 MG/DL
CALCIUM SERPL-MCNC: 9.8 MG/DL
CHLORIDE SERPL-SCNC: 105 MMOL/L
CO2 SERPL-SCNC: 25 MMOL/L
CREAT SERPL-MCNC: 0.97 MG/DL
EGFR: 97 ML/MIN/1.73M2
EOSINOPHIL # BLD AUTO: 0.16 K/UL — SIGNIFICANT CHANGE UP (ref 0–0.5)
EOSINOPHIL NFR BLD AUTO: 3.6 % — SIGNIFICANT CHANGE UP (ref 0–6)
GLUCOSE SERPL-MCNC: 98 MG/DL
HCT VFR BLD CALC: 47 % — SIGNIFICANT CHANGE UP (ref 39–50)
HGB BLD-MCNC: 15.6 G/DL — SIGNIFICANT CHANGE UP (ref 13–17)
IMM GRANULOCYTES NFR BLD AUTO: 0.2 % — SIGNIFICANT CHANGE UP (ref 0–1.5)
LDH SERPL-CCNC: 147 U/L
LYMPHOCYTES # BLD AUTO: 1.43 K/UL — SIGNIFICANT CHANGE UP (ref 1–3.3)
LYMPHOCYTES # BLD AUTO: 32.3 % — SIGNIFICANT CHANGE UP (ref 13–44)
MCHC RBC-ENTMCNC: 30.8 PG — SIGNIFICANT CHANGE UP (ref 27–34)
MCHC RBC-ENTMCNC: 33.2 G/DL — SIGNIFICANT CHANGE UP (ref 32–36)
MCV RBC AUTO: 92.9 FL — SIGNIFICANT CHANGE UP (ref 80–100)
MONOCYTES # BLD AUTO: 0.28 K/UL — SIGNIFICANT CHANGE UP (ref 0–0.9)
MONOCYTES NFR BLD AUTO: 6.3 % — SIGNIFICANT CHANGE UP (ref 2–14)
NEUTROPHILS # BLD AUTO: 2.55 K/UL — SIGNIFICANT CHANGE UP (ref 1.8–7.4)
NEUTROPHILS NFR BLD AUTO: 57.6 % — SIGNIFICANT CHANGE UP (ref 43–77)
NRBC # BLD: 0 /100 WBCS — SIGNIFICANT CHANGE UP (ref 0–0)
PLATELET # BLD AUTO: 289 K/UL — SIGNIFICANT CHANGE UP (ref 150–400)
POTASSIUM SERPL-SCNC: 4.4 MMOL/L
PROT SERPL-MCNC: 7.3 G/DL
RBC # BLD: 5.06 M/UL — SIGNIFICANT CHANGE UP (ref 4.2–5.8)
RBC # FLD: 12.1 % — SIGNIFICANT CHANGE UP (ref 10.3–14.5)
SODIUM SERPL-SCNC: 141 MMOL/L
WBC # BLD: 4.43 K/UL — SIGNIFICANT CHANGE UP (ref 3.8–10.5)
WBC # FLD AUTO: 4.43 K/UL — SIGNIFICANT CHANGE UP (ref 3.8–10.5)

## 2022-07-14 PROCEDURE — 99205 OFFICE O/P NEW HI 60 MIN: CPT

## 2022-07-14 RX ORDER — COLCHICINE 0.6 MG/1
0.6 TABLET ORAL
Qty: 30 | Refills: 0 | Status: ACTIVE | COMMUNITY
Start: 2022-07-07

## 2022-07-14 RX ORDER — PROPRANOLOL HYDROCHLORIDE 20 MG/1
20 TABLET ORAL
Qty: 60 | Refills: 0 | Status: DISCONTINUED | COMMUNITY
Start: 2022-06-16 | End: 2022-07-14

## 2022-07-14 RX ORDER — IBUPROFEN 600 MG/1
600 TABLET, FILM COATED ORAL
Qty: 9 | Refills: 0 | Status: ACTIVE | COMMUNITY
Start: 2022-06-21

## 2022-07-14 RX ORDER — PANTOPRAZOLE 40 MG/1
40 TABLET, DELAYED RELEASE ORAL
Qty: 7 | Refills: 0 | Status: ACTIVE | COMMUNITY
Start: 2022-06-21

## 2022-07-14 RX ORDER — NYSTATIN AND TRIAMCINOLONE ACETONIDE 100000; 1 MG/G; MG/G
100000-0.1 CREAM TOPICAL
Qty: 45 | Refills: 0 | Status: ACTIVE | COMMUNITY
Start: 2022-02-11

## 2022-07-14 NOTE — ASSESSMENT
[FreeTextEntry1] : 46yo gentleman with PMHx of hydronephrosis due to ureteral calculus, referred for erythrocytosis. \par \par Patient was evaluated on 6/15/22 for chest pain, after COVID vaccine, blood test were found to be c/w erythrocytosis. Studies in the ER were negative for a cardiac event. \par \par Denies fevers, positive for night sweats, denies weight loss. Denies bone or muscle pain, denies pruritus. \par \par \par Labs 6/15/22- RBC 5.47, Hb 16.6, Hct 52.1, MCV 95.2, PLts 260. Neutrophil 3.76, lymphocytes 2.26, monocytes 0.59, eos 0.13, basos 0.02. \par \par  I had a detailed discussion today with the patient regarding the natural history, epidemiology, diagnosis, and treatment of erythrocytosis. I reviewed his laboratory  studies in detail today. I then discussed the etiology of erythrocytosis likely dehydration, his counts have normalized. David 2 v 617 f mutation check today to r/o MPD. I answered all his questions to satisfaction.\par \par Greater than 50% of the encounter time was spent on counseling and coordination of care for erythrocytosis    and I have spent  45  minutes of face to face time with the patient.\par \par RTC 6 months. \par

## 2022-07-14 NOTE — REVIEW OF SYSTEMS
[Negative] : Allergic/Immunologic [Night Sweats] : night sweats [Chest Pain] : chest pain [SOB on Exertion] : shortness of breath during exertion [Joint Pain] : joint pain

## 2022-07-14 NOTE — HISTORY OF PRESENT ILLNESS
[0 - No Distress] : Distress Level: 0 [90: Able to carry normal activity; minor signs or symptoms of disease.] : 90: Able to carry normal activity; minor signs or symptoms of disease.  [de-identified] : 48yo gentleman with PMHx of hydronephrosis due to ureteral calculus, referred for erythrocytosis. \par \par Patient was evaluated on 6/15/22 for chest pain, after COVID vaccine, blood test were found to be c/w erythrocytosis. Studies in the ER were negative for a cardiac event. \par \par Denies fevers, positive for night sweats, denies weight loss. Denies bone or muscle pain, denies pruritus. \par \par \par Labs 6/15/22- RBC 5.47, Hb 16.6, Hct 52.1, MCV 95.2, PLts 260. Neutrophil 3.76, lymphocytes 2.26, monocytes 0.59, eos 0.13, basos 0.02. \par \par

## 2022-07-28 ENCOUNTER — NON-APPOINTMENT (OUTPATIENT)
Age: 47
End: 2022-07-28

## 2022-07-28 LAB
EXTRACTION: NORMAL
JAK2 P.V617F BLD/T QL: NORMAL
LAB DIRECTOR NAME PROVIDER: NORMAL
LABORATORY COMMENT REPORT: NORMAL
REFLEX:: NORMAL

## 2022-10-18 ENCOUNTER — APPOINTMENT (OUTPATIENT)
Dept: UROLOGY | Facility: CLINIC | Age: 47
End: 2022-10-18

## 2022-10-18 VITALS
SYSTOLIC BLOOD PRESSURE: 149 MMHG | OXYGEN SATURATION: 99 % | HEIGHT: 63 IN | BODY MASS INDEX: 26.58 KG/M2 | WEIGHT: 150 LBS | HEART RATE: 64 BPM | TEMPERATURE: 97.8 F | RESPIRATION RATE: 16 BRPM | DIASTOLIC BLOOD PRESSURE: 99 MMHG

## 2022-10-18 DIAGNOSIS — N20.0 CALCULUS OF KIDNEY: ICD-10-CM

## 2022-10-18 DIAGNOSIS — I86.1 SCROTAL VARICES: ICD-10-CM

## 2022-10-18 DIAGNOSIS — Q55.22 RETRACTILE TESTIS: ICD-10-CM

## 2022-10-18 DIAGNOSIS — N50.0 ATROPHY OF TESTIS: ICD-10-CM

## 2022-10-18 PROCEDURE — 76775 US EXAM ABDO BACK WALL LIM: CPT | Mod: 59

## 2022-10-18 PROCEDURE — 93976 VASCULAR STUDY: CPT

## 2022-10-18 PROCEDURE — 99214 OFFICE O/P EST MOD 30 MIN: CPT

## 2022-10-18 RX ORDER — ALFUZOSIN HYDROCHLORIDE 10 MG/1
10 TABLET, EXTENDED RELEASE ORAL
Qty: 90 | Refills: 3 | Status: ACTIVE | COMMUNITY
Start: 2022-10-18 | End: 1900-01-01

## 2022-10-18 NOTE — ASSESSMENT
[FreeTextEntry1] : Hx of nephrolithiasis \par Not symptomatic \par \par renal ultrasound today showed no hydro just stone on the right side 4 \par \par LUTS \par start alfuzosin \par will help with BPH \par The submitted E/M billing level for this visit reflects the total time spent on the day of the visit including documentation in EMR, face-to-face time spent with the patient, non-face-to-face review of medical records and relevant information, review of laboratory results available via [employer] portal, as well using a patient’s electronic medical records portal for patients with records not available to [facility] directly. \par \par Time spend counseling and performing coordination of care was also included in determining the appropriate EM billing level.\par \par I have reviewed and verified information regarding the chief complaint and history recorded by the ancillary staff and/or the patient. I have independently reviewed and interpreted tests performed by other physicians and facilities as necessary. I have reviewed images pertinent to providing the care to  the patient. \par \par Notes from other physicians were reviewed. \par \par \par I have reviewed with the patient previously ordered laboratory tests, discussed changes in levels, their meaning and consequences.\par \par I have discussed with the patient differential diagnosis, reason for auxiliary tests if ordered, risks, benefits, alternatives, and complications of each form of therapy included surgical therapy. Off label use of most of medications used in andrology was reviewed. \par \par Additional labs and imaging studies were ordered. \par \par All questions were answered. \par \par \par \par

## 2022-10-18 NOTE — HISTORY OF PRESENT ILLNESS
[FreeTextEntry1] : 47M w/ hx of nephrolithiasis had URS/LL last year. \par \par Has increased his water intake and citrus fluids.\par \par Previous CT showed a right non-obstructive stone. Has mild left hydronephrosis on RBUS. \par \par Has bilateral very mild pain in the flank \par \par has not done any of the recommended follow up \par \par LUTS gets up at night weaker stream \par \par

## 2022-11-15 NOTE — PATIENT PROFILE ADULT - TRANSPORTATION
Detail Level: Detailed Add 96360 Cpt? (Important Note: In 2017 The Use Of 88096 Is Being Tracked By Cms To Determine Future Global Period Reimbursement For Global Periods): no no

## 2022-11-29 LAB
APPEARANCE: CLEAR
BACTERIA: NEGATIVE
BILIRUBIN URINE: NEGATIVE
BLOOD URINE: NEGATIVE
COLOR: NORMAL
GLUCOSE QUALITATIVE U: NEGATIVE
HYALINE CASTS: 0 /LPF
KETONES URINE: NEGATIVE
LEUKOCYTE ESTERASE URINE: NEGATIVE
MICROSCOPIC-UA: NORMAL
NITRITE URINE: NEGATIVE
PH URINE: 6
PROTEIN URINE: NEGATIVE
RED BLOOD CELLS URINE: 0 /HPF
SPECIFIC GRAVITY URINE: 1.02
SQUAMOUS EPITHELIAL CELLS: 0 /HPF
UROBILINOGEN URINE: NORMAL
WHITE BLOOD CELLS URINE: 0 /HPF

## 2023-01-06 ENCOUNTER — OUTPATIENT (OUTPATIENT)
Dept: OUTPATIENT SERVICES | Facility: HOSPITAL | Age: 48
LOS: 1 days | Discharge: HOME | End: 2023-01-06

## 2023-01-06 DIAGNOSIS — Z98.890 OTHER SPECIFIED POSTPROCEDURAL STATES: Chronic | ICD-10-CM

## 2023-01-06 DIAGNOSIS — D75.0 FAMILIAL ERYTHROCYTOSIS: ICD-10-CM

## 2023-01-12 ENCOUNTER — APPOINTMENT (OUTPATIENT)
Dept: HEMATOLOGY ONCOLOGY | Facility: CLINIC | Age: 48
End: 2023-01-12
Payer: COMMERCIAL

## 2023-01-12 ENCOUNTER — RESULT REVIEW (OUTPATIENT)
Age: 48
End: 2023-01-12

## 2023-01-12 VITALS
BODY MASS INDEX: 27.93 KG/M2 | HEIGHT: 63 IN | TEMPERATURE: 97.4 F | OXYGEN SATURATION: 99 % | DIASTOLIC BLOOD PRESSURE: 99 MMHG | RESPIRATION RATE: 16 BRPM | SYSTOLIC BLOOD PRESSURE: 141 MMHG | HEART RATE: 67 BPM | WEIGHT: 157.63 LBS

## 2023-01-12 DIAGNOSIS — D75.1 SECONDARY POLYCYTHEMIA: ICD-10-CM

## 2023-01-12 LAB
ALBUMIN SERPL ELPH-MCNC: 4.8 G/DL
ALP BLD-CCNC: 113 U/L
ALT SERPL-CCNC: 54 U/L
ANION GAP SERPL CALC-SCNC: 11 MMOL/L
AST SERPL-CCNC: 31 U/L
BASOPHILS # BLD AUTO: 0.03 K/UL — SIGNIFICANT CHANGE UP (ref 0–0.2)
BASOPHILS NFR BLD AUTO: 0.5 % — SIGNIFICANT CHANGE UP (ref 0–2)
BILIRUB SERPL-MCNC: 0.4 MG/DL
BUN SERPL-MCNC: 15 MG/DL
CALCIUM SERPL-MCNC: 9.9 MG/DL
CHLORIDE SERPL-SCNC: 103 MMOL/L
CO2 SERPL-SCNC: 27 MMOL/L
CREAT SERPL-MCNC: 1.07 MG/DL
EGFR: 86 ML/MIN/1.73M2
EOSINOPHIL # BLD AUTO: 0.28 K/UL — SIGNIFICANT CHANGE UP (ref 0–0.5)
EOSINOPHIL NFR BLD AUTO: 4.2 % — SIGNIFICANT CHANGE UP (ref 0–6)
GLUCOSE SERPL-MCNC: 93 MG/DL
HCT VFR BLD CALC: 45.8 % — SIGNIFICANT CHANGE UP (ref 39–50)
HGB BLD-MCNC: 15.4 G/DL — SIGNIFICANT CHANGE UP (ref 13–17)
IMM GRANULOCYTES NFR BLD AUTO: 0.2 % — SIGNIFICANT CHANGE UP (ref 0–0.9)
LDH SERPL-CCNC: 168 U/L
LYMPHOCYTES # BLD AUTO: 2.7 K/UL — SIGNIFICANT CHANGE UP (ref 1–3.3)
LYMPHOCYTES # BLD AUTO: 40.6 % — SIGNIFICANT CHANGE UP (ref 13–44)
MCHC RBC-ENTMCNC: 30.7 PG — SIGNIFICANT CHANGE UP (ref 27–34)
MCHC RBC-ENTMCNC: 33.6 G/DL — SIGNIFICANT CHANGE UP (ref 32–36)
MCV RBC AUTO: 91.4 FL — SIGNIFICANT CHANGE UP (ref 80–100)
MONOCYTES # BLD AUTO: 0.56 K/UL — SIGNIFICANT CHANGE UP (ref 0–0.9)
MONOCYTES NFR BLD AUTO: 8.4 % — SIGNIFICANT CHANGE UP (ref 2–14)
NEUTROPHILS # BLD AUTO: 3.07 K/UL — SIGNIFICANT CHANGE UP (ref 1.8–7.4)
NEUTROPHILS NFR BLD AUTO: 46.1 % — SIGNIFICANT CHANGE UP (ref 43–77)
NRBC # BLD: 0 /100 WBCS — SIGNIFICANT CHANGE UP (ref 0–0)
PLATELET # BLD AUTO: 293 K/UL — SIGNIFICANT CHANGE UP (ref 150–400)
POTASSIUM SERPL-SCNC: 4.6 MMOL/L
PROT SERPL-MCNC: 7.4 G/DL
RBC # BLD: 5.01 M/UL — SIGNIFICANT CHANGE UP (ref 4.2–5.8)
RBC # FLD: 12.2 % — SIGNIFICANT CHANGE UP (ref 10.3–14.5)
SODIUM SERPL-SCNC: 140 MMOL/L
WBC # BLD: 6.65 K/UL — SIGNIFICANT CHANGE UP (ref 3.8–10.5)
WBC # FLD AUTO: 6.65 K/UL — SIGNIFICANT CHANGE UP (ref 3.8–10.5)

## 2023-01-12 PROCEDURE — 99214 OFFICE O/P EST MOD 30 MIN: CPT

## 2023-01-12 NOTE — REVIEW OF SYSTEMS
[Night Sweats] : no night sweats [Chest Pain] : no chest pain [SOB on Exertion] : no shortness of breath during exertion [Joint Pain] : no joint pain [Negative] : Musculoskeletal

## 2023-01-12 NOTE — ASSESSMENT
[FreeTextEntry1] : 48yo gentleman with PMHx of hydronephrosis due to ureteral calculus, referred for erythrocytosis. \par \par Patient was evaluated on 6/15/22 for chest pain, after COVID vaccine, blood test were found to be c/w erythrocytosis. Studies in the ER were negative for a cardiac event. \par \par Denies fevers, positive for night sweats, denies weight loss. Denies bone or muscle pain, denies pruritus. \par \par \par Labs 6/15/22- RBC 5.47, Hb 16.6, Hct 52.1, MCV 95.2, PLts 260. Neutrophil 3.76, lymphocytes 2.26, monocytes 0.59, eos 0.13, basos 0.02. \par \par 1/12/23- WBC 6.65, Hb 15.4g/dl, Hct 45.8%, MCV 91.4, RDW 12.2%, . Counts have been stable, refer back to PCP, will come back if counts become abnormal. \par \par RTC prn.\par

## 2023-01-12 NOTE — HISTORY OF PRESENT ILLNESS
[0 - No Distress] : Distress Level: 0 [de-identified] : 46yo gentleman with PMHx of hydronephrosis due to ureteral calculus, referred for erythrocytosis. \par \par Patient was evaluated on 6/15/22 for chest pain, after COVID vaccine, blood test were found to be c/w erythrocytosis. Studies in the ER were negative for a cardiac event. \par \par Denies fevers, positive for night sweats, denies weight loss. Denies bone or muscle pain, denies pruritus. \par \par \par Labs 6/15/22- RBC 5.47, Hb 16.6, Hct 52.1, MCV 95.2, PLts 260. Neutrophil 3.76, lymphocytes 2.26, monocytes 0.59, eos 0.13, basos 0.02. \par \par   [de-identified] : Patient is feeling well, denies fevers, night sweats or weight loss. \par \par 7/28/22- NIKI v617F negative. \par \par No other changes in medical, surgical or social history since 7/14/22\par  [100: Normal, no complaints, no evidence of disease.] : 100: Normal, no complaints, no evidence of disease.

## 2023-08-08 NOTE — PRE-OP CHECKLIST - NS PREOP CHK HIBICLENS NA
There are no preventive care reminders to display for this patient.    Patient is up to date, no discussion needed.   N/A

## 2023-10-26 ENCOUNTER — EMERGENCY (EMERGENCY)
Facility: HOSPITAL | Age: 48
LOS: 1 days | Discharge: HOME | End: 2023-10-26
Attending: EMERGENCY MEDICINE | Admitting: EMERGENCY MEDICINE
Payer: COMMERCIAL

## 2023-10-26 VITALS
DIASTOLIC BLOOD PRESSURE: 82 MMHG | RESPIRATION RATE: 16 BRPM | HEART RATE: 76 BPM | OXYGEN SATURATION: 97 % | HEIGHT: 62 IN | TEMPERATURE: 98 F | WEIGHT: 149.91 LBS | SYSTOLIC BLOOD PRESSURE: 143 MMHG

## 2023-10-26 DIAGNOSIS — Z98.890 OTHER SPECIFIED POSTPROCEDURAL STATES: Chronic | ICD-10-CM

## 2023-10-26 PROCEDURE — 99284 EMERGENCY DEPT VISIT MOD MDM: CPT

## 2023-10-26 RX ORDER — ACETAMINOPHEN 500 MG
1000 TABLET ORAL ONCE
Refills: 0 | Status: COMPLETED | OUTPATIENT
Start: 2023-10-26 | End: 2023-10-26

## 2023-10-26 RX ORDER — SODIUM CHLORIDE 9 MG/ML
1000 INJECTION INTRAMUSCULAR; INTRAVENOUS; SUBCUTANEOUS ONCE
Refills: 0 | Status: COMPLETED | OUTPATIENT
Start: 2023-10-26 | End: 2023-10-26

## 2023-10-26 NOTE — ED PROVIDER NOTE - NSFOLLOWUPINSTRUCTIONS_ED_ALL_ED_FT
1) Follow-up with your Primary Medical Doctor and Dr. Devine. Call today / next business day for prompt follow-up.  2) Return to Emergency room for any worsening or persistent pain, weakness, fever, or any other concerning symptoms.  3) See attached instruction sheets for additional information, including information regarding signs and symptoms to look out for, reasons to seek immediate care and other important instructions.  4) Follow-up with any specialists as discussed / noted as well.    Pain in the abdomen (abdominal pain) can be caused by many things. Often, abdominal pain is not serious and it gets better with no treatment or by being treated at home. However, sometimes abdominal pain is serious.    Your health care provider will ask questions about your medical history and do a physical exam to try to determine the cause of your abdominal pain.    Follow these instructions at home:  Medicines    Take over-the-counter and prescription medicines only as told by your health care provider.  Do not take a laxative unless told by your health care provider.  General instructions      Watch your condition for any changes.  Drink enough fluid to keep your urine pale yellow.  Keep all follow-up visits as told by your health care provider. This is important.  Contact a health care provider if:  Your abdominal pain changes or gets worse.  You are not hungry or you lose weight without trying.  You are constipated or have diarrhea for more than 2–3 days.  You have pain when you urinate or have a bowel movement.  Your abdominal pain wakes you up at night.  Your pain gets worse with meals, after eating, or with certain foods.  You are vomiting and cannot keep anything down.  You have a fever.  You have blood in your urine.  Get help right away if:  Your pain does not go away as soon as your health care provider told you to expect.  You cannot stop vomiting.  Your pain is only in areas of the abdomen, such as the right side or the left lower portion of the abdomen. Pain on the right side could be caused by appendicitis.  You have bloody or black stools, or stools that look like tar.  You have severe pain, cramping, or bloating in your abdomen.  You have signs of dehydration, such as:  Dark urine, very little urine, or no urine.  Cracked lips.  Dry mouth.  Sunken eyes.  Sleepiness.  Weakness.  You have trouble breathing or chest pain.  Summary  Often, abdominal pain is not serious and it gets better with no treatment or by being treated at home. However, sometimes abdominal pain is serious.  Watch your condition for any changes.  Take over-the-counter and prescription medicines only as told by your health care provider.  Contact a health care provider if your abdominal pain changes or gets worse.  Get help right away if you have severe pain, cramping, or bloating in your abdomen.  This information is not intended to replace advice given to you by your health care provider. Make sure you discuss any questions you have with your health care provider.

## 2023-10-26 NOTE — ED ADULT TRIAGE NOTE - CHIEF COMPLAINT QUOTE
left sided abdominal pain with nausea and vomiting earlier, pain has since subsided tonight.  patient adds he has been going to the bathroom more often and saw bright red blood in stool.  Hx Hemmorrhoids

## 2023-10-26 NOTE — ED PROVIDER NOTE - PATIENT PORTAL LINK FT
You can access the FollowMyHealth Patient Portal offered by [facility] by registering at the following website: [email or web address]. By joining [employer]’s FollowMyHealth portal, you will also be able to view your health information using other applications (apps) compatible with our system.

## 2023-10-26 NOTE — ED PROVIDER NOTE - PHYSICAL EXAMINATION
Gen: Alert, NAD  Head/eyes: NC/AT, PERRL  ENT: airway patent  Neck: supple  Pulm/lung: Bilateral clear BS  CV/heart: RRR  GI/Abd: soft, +mild llq ttp/ND, +BS, no guarding/rebound tenderness  Musculoskeletal: no edema/erythema/cyanosis  Skin: no rash  Neuro: AAOx3, grossly intact

## 2023-10-26 NOTE — ED PROVIDER NOTE - CLINICAL SUMMARY MEDICAL DECISION MAKING FREE TEXT BOX
48-year-old male history of renal stones.  Complaining about left sided abdominal pain with associated nausea vomiting began around 8:20 PM tonight admits to having multiple episodes of bowel movements that have been soft.  Denies any diarrhea.  Denies any fever or chills.  States currently pain has subsided initially was a 9 out of 10.    r/o diverticulitis, renal stone, uti, labs, CT a/p, IV analgesia, declining antiemetics 48-year-old male history of renal stones.  Complaining about left sided abdominal pain with associated nausea vomiting began around 8:20 PM tonight admits to having multiple episodes of bowel movements that have been soft.  Denies any diarrhea.  Denies any fever or chills.  States currently pain has subsided initially was a 9 out of 10. Denies any urinary symptoms    r/o diverticulitis, renal stone, labs, CT a/p, IV analgesia, declining antiemetics

## 2023-10-26 NOTE — ED PROVIDER NOTE - OBJECTIVE STATEMENT
48-year-old male history of renal stones.  Complaining about left sided abdominal pain with associated nausea vomiting began around 8:20 PM tonight admits to having multiple episodes of bowel movements that have been soft.  Denies any diarrhea.  Denies any fever or chills.  States currently pain has subsided initially was a 9 out of 10. 48-year-old male history of renal stones.  Complaining about left sided abdominal pain with associated nausea vomiting began around 8:20 PM tonight admits to having multiple episodes of bowel movements that have been soft.  Denies any diarrhea.  Denies any fever or chills.  States currently pain has subsided initially was a 9 out of 10. Denies any urinary symptoms

## 2023-10-26 NOTE — ED PROVIDER NOTE - PROGRESS NOTE DETAILS
Labs and imaging explained.  Patient understands the limitations of the CT without contrast.  Patient feels better will follow up with outpatient PMD and GI.

## 2023-10-26 NOTE — ED PROVIDER NOTE - CARE PROVIDER_API CALL
Gary Devine  Gastroenterology  [address] [place] [address]  Phone: [phone]  Fax: [phone]  Follow Up Time:

## 2023-10-27 VITALS
TEMPERATURE: 98 F | DIASTOLIC BLOOD PRESSURE: 68 MMHG | RESPIRATION RATE: 17 BRPM | OXYGEN SATURATION: 98 % | HEART RATE: 63 BPM | SYSTOLIC BLOOD PRESSURE: 105 MMHG

## 2023-10-27 LAB
ALBUMIN SERPL ELPH-MCNC: 3.7 G/DL — SIGNIFICANT CHANGE UP (ref 3.3–5)
ALBUMIN SERPL ELPH-MCNC: 3.7 G/DL — SIGNIFICANT CHANGE UP (ref 3.3–5)
ALP SERPL-CCNC: 91 U/L — SIGNIFICANT CHANGE UP (ref 40–120)
ALP SERPL-CCNC: 91 U/L — SIGNIFICANT CHANGE UP (ref 40–120)
ALT FLD-CCNC: 46 U/L — SIGNIFICANT CHANGE UP (ref 12–78)
ALT FLD-CCNC: 46 U/L — SIGNIFICANT CHANGE UP (ref 12–78)
ANION GAP SERPL CALC-SCNC: 7 MMOL/L — SIGNIFICANT CHANGE UP (ref 5–17)
ANION GAP SERPL CALC-SCNC: 7 MMOL/L — SIGNIFICANT CHANGE UP (ref 5–17)
APTT BLD: 30.1 SEC — SIGNIFICANT CHANGE UP (ref 24.5–35.6)
APTT BLD: 30.1 SEC — SIGNIFICANT CHANGE UP (ref 24.5–35.6)
AST SERPL-CCNC: 25 U/L — SIGNIFICANT CHANGE UP (ref 15–37)
AST SERPL-CCNC: 25 U/L — SIGNIFICANT CHANGE UP (ref 15–37)
BASOPHILS # BLD AUTO: 0.03 K/UL — SIGNIFICANT CHANGE UP (ref 0–0.2)
BASOPHILS # BLD AUTO: 0.03 K/UL — SIGNIFICANT CHANGE UP (ref 0–0.2)
BASOPHILS NFR BLD AUTO: 0.3 % — SIGNIFICANT CHANGE UP (ref 0–2)
BASOPHILS NFR BLD AUTO: 0.3 % — SIGNIFICANT CHANGE UP (ref 0–2)
BILIRUB SERPL-MCNC: 0.4 MG/DL — SIGNIFICANT CHANGE UP (ref 0.2–1.2)
BILIRUB SERPL-MCNC: 0.4 MG/DL — SIGNIFICANT CHANGE UP (ref 0.2–1.2)
BUN SERPL-MCNC: 10 MG/DL — SIGNIFICANT CHANGE UP (ref 7–23)
BUN SERPL-MCNC: 10 MG/DL — SIGNIFICANT CHANGE UP (ref 7–23)
CALCIUM SERPL-MCNC: 9 MG/DL — SIGNIFICANT CHANGE UP (ref 8.5–10.1)
CALCIUM SERPL-MCNC: 9 MG/DL — SIGNIFICANT CHANGE UP (ref 8.5–10.1)
CHLORIDE SERPL-SCNC: 108 MMOL/L — SIGNIFICANT CHANGE UP (ref 96–108)
CHLORIDE SERPL-SCNC: 108 MMOL/L — SIGNIFICANT CHANGE UP (ref 96–108)
CO2 SERPL-SCNC: 28 MMOL/L — SIGNIFICANT CHANGE UP (ref 22–31)
CO2 SERPL-SCNC: 28 MMOL/L — SIGNIFICANT CHANGE UP (ref 22–31)
CREAT SERPL-MCNC: 1.1 MG/DL — SIGNIFICANT CHANGE UP (ref 0.5–1.3)
CREAT SERPL-MCNC: 1.1 MG/DL — SIGNIFICANT CHANGE UP (ref 0.5–1.3)
EGFR: 83 ML/MIN/1.73M2 — SIGNIFICANT CHANGE UP
EGFR: 83 ML/MIN/1.73M2 — SIGNIFICANT CHANGE UP
EOSINOPHIL # BLD AUTO: 0.19 K/UL — SIGNIFICANT CHANGE UP (ref 0–0.5)
EOSINOPHIL # BLD AUTO: 0.19 K/UL — SIGNIFICANT CHANGE UP (ref 0–0.5)
EOSINOPHIL NFR BLD AUTO: 2.1 % — SIGNIFICANT CHANGE UP (ref 0–6)
EOSINOPHIL NFR BLD AUTO: 2.1 % — SIGNIFICANT CHANGE UP (ref 0–6)
GLUCOSE SERPL-MCNC: 118 MG/DL — HIGH (ref 70–99)
GLUCOSE SERPL-MCNC: 118 MG/DL — HIGH (ref 70–99)
HCT VFR BLD CALC: 45 % — SIGNIFICANT CHANGE UP (ref 39–50)
HCT VFR BLD CALC: 45 % — SIGNIFICANT CHANGE UP (ref 39–50)
HGB BLD-MCNC: 15.1 G/DL — SIGNIFICANT CHANGE UP (ref 13–17)
HGB BLD-MCNC: 15.1 G/DL — SIGNIFICANT CHANGE UP (ref 13–17)
IMM GRANULOCYTES NFR BLD AUTO: 0.2 % — SIGNIFICANT CHANGE UP (ref 0–0.9)
IMM GRANULOCYTES NFR BLD AUTO: 0.2 % — SIGNIFICANT CHANGE UP (ref 0–0.9)
INR BLD: 0.98 RATIO — SIGNIFICANT CHANGE UP (ref 0.85–1.18)
INR BLD: 0.98 RATIO — SIGNIFICANT CHANGE UP (ref 0.85–1.18)
LIDOCAIN IGE QN: 49 U/L — SIGNIFICANT CHANGE UP (ref 13–75)
LIDOCAIN IGE QN: 49 U/L — SIGNIFICANT CHANGE UP (ref 13–75)
LYMPHOCYTES # BLD AUTO: 2.06 K/UL — SIGNIFICANT CHANGE UP (ref 1–3.3)
LYMPHOCYTES # BLD AUTO: 2.06 K/UL — SIGNIFICANT CHANGE UP (ref 1–3.3)
LYMPHOCYTES # BLD AUTO: 22.7 % — SIGNIFICANT CHANGE UP (ref 13–44)
LYMPHOCYTES # BLD AUTO: 22.7 % — SIGNIFICANT CHANGE UP (ref 13–44)
MCHC RBC-ENTMCNC: 30.9 PG — SIGNIFICANT CHANGE UP (ref 27–34)
MCHC RBC-ENTMCNC: 30.9 PG — SIGNIFICANT CHANGE UP (ref 27–34)
MCHC RBC-ENTMCNC: 33.6 GM/DL — SIGNIFICANT CHANGE UP (ref 32–36)
MCHC RBC-ENTMCNC: 33.6 GM/DL — SIGNIFICANT CHANGE UP (ref 32–36)
MCV RBC AUTO: 92.2 FL — SIGNIFICANT CHANGE UP (ref 80–100)
MCV RBC AUTO: 92.2 FL — SIGNIFICANT CHANGE UP (ref 80–100)
MONOCYTES # BLD AUTO: 0.67 K/UL — SIGNIFICANT CHANGE UP (ref 0–0.9)
MONOCYTES # BLD AUTO: 0.67 K/UL — SIGNIFICANT CHANGE UP (ref 0–0.9)
MONOCYTES NFR BLD AUTO: 7.4 % — SIGNIFICANT CHANGE UP (ref 2–14)
MONOCYTES NFR BLD AUTO: 7.4 % — SIGNIFICANT CHANGE UP (ref 2–14)
NEUTROPHILS # BLD AUTO: 6.1 K/UL — SIGNIFICANT CHANGE UP (ref 1.8–7.4)
NEUTROPHILS # BLD AUTO: 6.1 K/UL — SIGNIFICANT CHANGE UP (ref 1.8–7.4)
NEUTROPHILS NFR BLD AUTO: 67.3 % — SIGNIFICANT CHANGE UP (ref 43–77)
NEUTROPHILS NFR BLD AUTO: 67.3 % — SIGNIFICANT CHANGE UP (ref 43–77)
NRBC # BLD: 0 /100 WBCS — SIGNIFICANT CHANGE UP (ref 0–0)
NRBC # BLD: 0 /100 WBCS — SIGNIFICANT CHANGE UP (ref 0–0)
PLATELET # BLD AUTO: 288 K/UL — SIGNIFICANT CHANGE UP (ref 150–400)
PLATELET # BLD AUTO: 288 K/UL — SIGNIFICANT CHANGE UP (ref 150–400)
POTASSIUM SERPL-MCNC: 4.2 MMOL/L — SIGNIFICANT CHANGE UP (ref 3.5–5.3)
POTASSIUM SERPL-MCNC: 4.2 MMOL/L — SIGNIFICANT CHANGE UP (ref 3.5–5.3)
POTASSIUM SERPL-SCNC: 4.2 MMOL/L — SIGNIFICANT CHANGE UP (ref 3.5–5.3)
POTASSIUM SERPL-SCNC: 4.2 MMOL/L — SIGNIFICANT CHANGE UP (ref 3.5–5.3)
PROT SERPL-MCNC: 7.1 G/DL — SIGNIFICANT CHANGE UP (ref 6–8.3)
PROT SERPL-MCNC: 7.1 G/DL — SIGNIFICANT CHANGE UP (ref 6–8.3)
PROTHROM AB SERPL-ACNC: 11.5 SEC — SIGNIFICANT CHANGE UP (ref 9.5–13)
PROTHROM AB SERPL-ACNC: 11.5 SEC — SIGNIFICANT CHANGE UP (ref 9.5–13)
RBC # BLD: 4.88 M/UL — SIGNIFICANT CHANGE UP (ref 4.2–5.8)
RBC # BLD: 4.88 M/UL — SIGNIFICANT CHANGE UP (ref 4.2–5.8)
RBC # FLD: 12.5 % — SIGNIFICANT CHANGE UP (ref 10.3–14.5)
RBC # FLD: 12.5 % — SIGNIFICANT CHANGE UP (ref 10.3–14.5)
SODIUM SERPL-SCNC: 143 MMOL/L — SIGNIFICANT CHANGE UP (ref 135–145)
SODIUM SERPL-SCNC: 143 MMOL/L — SIGNIFICANT CHANGE UP (ref 135–145)
WBC # BLD: 9.07 K/UL — SIGNIFICANT CHANGE UP (ref 3.8–10.5)
WBC # BLD: 9.07 K/UL — SIGNIFICANT CHANGE UP (ref 3.8–10.5)
WBC # FLD AUTO: 9.07 K/UL — SIGNIFICANT CHANGE UP (ref 3.8–10.5)
WBC # FLD AUTO: 9.07 K/UL — SIGNIFICANT CHANGE UP (ref 3.8–10.5)

## 2023-10-27 PROCEDURE — 83690 ASSAY OF LIPASE: CPT

## 2023-10-27 PROCEDURE — 85730 THROMBOPLASTIN TIME PARTIAL: CPT

## 2023-10-27 PROCEDURE — 85610 PROTHROMBIN TIME: CPT

## 2023-10-27 PROCEDURE — 99284 EMERGENCY DEPT VISIT MOD MDM: CPT | Mod: 25

## 2023-10-27 PROCEDURE — 36415 COLL VENOUS BLD VENIPUNCTURE: CPT

## 2023-10-27 PROCEDURE — 96365 THER/PROPH/DIAG IV INF INIT: CPT

## 2023-10-27 PROCEDURE — 74176 CT ABD & PELVIS W/O CONTRAST: CPT | Mod: MA

## 2023-10-27 PROCEDURE — 80053 COMPREHEN METABOLIC PANEL: CPT

## 2023-10-27 PROCEDURE — 74176 CT ABD & PELVIS W/O CONTRAST: CPT | Mod: 26,MA

## 2023-10-27 PROCEDURE — 85025 COMPLETE CBC W/AUTO DIFF WBC: CPT

## 2023-10-27 RX ADMIN — SODIUM CHLORIDE 1000 MILLILITER(S): 9 INJECTION INTRAMUSCULAR; INTRAVENOUS; SUBCUTANEOUS at 00:44

## 2023-10-27 RX ADMIN — Medication 1000 MILLIGRAM(S): at 01:44

## 2023-10-27 RX ADMIN — SODIUM CHLORIDE 1000 MILLILITER(S): 9 INJECTION INTRAMUSCULAR; INTRAVENOUS; SUBCUTANEOUS at 01:00

## 2023-10-27 RX ADMIN — Medication 400 MILLIGRAM(S): at 00:44

## 2023-10-27 NOTE — ED ADULT NURSE NOTE - NSFALLUNIVINTERV_ED_ALL_ED
Bed/Stretcher in lowest position, wheels locked, appropriate side rails in place/Call bell, personal items and telephone in reach/Instruct patient to call for assistance before getting out of bed/chair/stretcher/Non-slip footwear applied when patient is off stretcher/[place] to call system/Physically safe environment - no spills, clutter or unnecessary equipment/Purposeful proactive rounding/Room/bathroom lighting operational, light cord in reach

## 2023-10-27 NOTE — ED ADULT NURSE NOTE - OBJECTIVE STATEMENT
Pt stated he began experiencing abdominal pain, loose stool, nausea and vomiting. He also sated he had '"pressure on head a couple of days ago" that resolved.  He stated he has mild dizziness as well. Denies ryley, chills or weakness

## 2023-10-27 NOTE — ED ADULT NURSE REASSESSMENT NOTE - NSFALLRISKINTERV_ED_ALL_ED

## 2024-08-13 ENCOUNTER — EMERGENCY (EMERGENCY)
Facility: HOSPITAL | Age: 49
LOS: 1 days | Discharge: HOME | End: 2024-08-13
Attending: EMERGENCY MEDICINE | Admitting: EMERGENCY MEDICINE
Payer: COMMERCIAL

## 2024-08-13 VITALS
DIASTOLIC BLOOD PRESSURE: 63 MMHG | SYSTOLIC BLOOD PRESSURE: 124 MMHG | HEART RATE: 57 BPM | OXYGEN SATURATION: 100 % | RESPIRATION RATE: 16 BRPM | TEMPERATURE: 98 F

## 2024-08-13 VITALS
WEIGHT: 154.98 LBS | RESPIRATION RATE: 19 BRPM | OXYGEN SATURATION: 99 % | HEART RATE: 68 BPM | TEMPERATURE: 98 F | SYSTOLIC BLOOD PRESSURE: 157 MMHG | DIASTOLIC BLOOD PRESSURE: 99 MMHG

## 2024-08-13 DIAGNOSIS — Z98.890 OTHER SPECIFIED POSTPROCEDURAL STATES: Chronic | ICD-10-CM

## 2024-08-13 LAB
APPEARANCE UR: CLEAR — SIGNIFICANT CHANGE UP
BILIRUB UR-MCNC: NEGATIVE — SIGNIFICANT CHANGE UP
COLOR SPEC: SIGNIFICANT CHANGE UP
DIFF PNL FLD: NEGATIVE — SIGNIFICANT CHANGE UP
GLUCOSE UR QL: NEGATIVE MG/DL — SIGNIFICANT CHANGE UP
KETONES UR-MCNC: NEGATIVE MG/DL — SIGNIFICANT CHANGE UP
LEUKOCYTE ESTERASE UR-ACNC: NEGATIVE — SIGNIFICANT CHANGE UP
NITRITE UR-MCNC: NEGATIVE — SIGNIFICANT CHANGE UP
PH UR: 6 — SIGNIFICANT CHANGE UP (ref 5–8)
PROT UR-MCNC: NEGATIVE MG/DL — SIGNIFICANT CHANGE UP
SP GR SPEC: 1.01 — SIGNIFICANT CHANGE UP (ref 1–1.03)
UROBILINOGEN FLD QL: 0.2 MG/DL — SIGNIFICANT CHANGE UP (ref 0.2–1)

## 2024-08-13 PROCEDURE — 76870 US EXAM SCROTUM: CPT | Mod: 26

## 2024-08-13 PROCEDURE — 99284 EMERGENCY DEPT VISIT MOD MDM: CPT

## 2024-08-13 RX ORDER — IBUPROFEN 200 MG
600 TABLET ORAL ONCE
Refills: 0 | Status: COMPLETED | OUTPATIENT
Start: 2024-08-13 | End: 2024-08-13

## 2024-08-13 NOTE — ED PROVIDER NOTE - PATIENT PORTAL LINK FT
You can access the FollowMyHealth Patient Portal offered by [facility] by registering at the following website: [email or web address]. By joining [employer]’s FollowMyHealth portal, you will also be able to view your health information using other applications (apps) compatible with our system.

## 2024-08-13 NOTE — ED ADULT TRIAGE NOTE - CHIEF COMPLAINT QUOTE
Pt c/o R sided testicular pain/swelling intermittent started this morning and returning tonight. Denies med hx or daily meds, Denies injury.

## 2024-08-13 NOTE — ED PROVIDER NOTE - NSFOLLOWUPINSTRUCTIONS_ED_ALL_ED_FT
You were seen in the Emergency Department for testicular pain. You gave a urine sample which did not show signs of urinary tract infection. You underwent ultrasound of your testicles which did not show evidence of testicular torsion or inflammation of your epididymis. Please follow up with your primary care doctor or urologist for further management.    To control your pain at home, you should take Ibuprofen 600 mg along with Tylenol 650mg-1000mg every 6 to 8 hours. Limit your maximum daily Tylenol from all sources to 4000mg. Be aware that many other medications contain acetaminophen which is also known as Tylenol. Taking Tylenol and Ibuprofen together has been shown to be more effective at relieving pain than taking them separately. These are both over the counter medications that you can  at your local pharmacy without a prescription. You need to respect all of the warnings on the bottles. You shouldn’t take these medications for more than a week without following up with your doctor. Both medications come with certain risks and side effects that you need to discuss with your doctor, especially if you are taking them for a prolonged period.      1) Continue all previously prescribed medications as directed.    2) Follow up with your primary care physician - take copies of your results.    3) Return to the Emergency Department for worsening or persistent symptoms, and/or ANY NEW OR CONCERNING SYMPTOMS.

## 2024-08-13 NOTE — ED ADULT NURSE NOTE - OBJECTIVE STATEMENT
2020         RE: Fallon [place]  [address] [place] [address]        Dear Colleague,    Thank you for referring your patient, Fallon Johnson, to the [facility]. Please see a copy of my visit note below.    Visit Date:   2020      Rose Brewer MD   [facility]    [address] [place] [address], [address]   [place] [address]      Patient:  Fallon Johnson   MRN:  [id number]   :  1982         Dear Dr. Brewer:      Thank you for asking us to see your patient, Fallon Johnson, in consultation for further evaluation and management of a recently diagnosed lobular carcinoma in situ.  Please see the enclosed note for details of our visit 2020, as well as recommendations.      HISTORY OF PRESENT ILLNESS:  Fallon Johnson is a 37-year-old female who presents to the [facility] for further evaluation of her left breast LCIS.  The patient's history dates back for the last few months when she noticed a breast abnormality.  States it felt different.  Could not specify.  Eventually did feel a lump.  Met with Dr. Garcia, who ordered a diagnostic mammogram.  This was performed on 2020.  This did show microcalcification in the mid to posterior lower right breast.  Palpable mass in the left breast corresponding with a cyst measuring 1.8 cm.  Sterotactic biopsy of 2 sites in the right breast, 1 at the 7 o'clock position, 10 cm from nipple, showed atypical ductal hyperplasia, and another biopsy at 6 o'clock position, 7 cm from the nipple, showed benign breast tissue.  The patient went on to meet with Dr. Brewer, who discussed excisional biopsy.  The patient was then taken to the operating theatre on 2020 and underwent right seed localized excisional biopsy x 2.  Pathology of the right medial excision showed atypical ductal hyperplasia, pleomorphic lobular carcinoma in situ, no evidence of ductal carcinoma in situ or invasive  malignancy.  The lateral right breast excisional showed fibrocystic changes, proliferative type, no evidence of malignancy.  Dr. Brewer then discussed options of chemo prevention versus mastectomy.  The patient presents to clinic today to discuss this further.  On today's visit, the patient reports she has had abdominal discomfort, underwent hernia repair .  States that she has been losing weight, but she feels this may be related to her thyroid.  She states that her doctor tends to keep her on the hyperthyroid side.  Does have a burning, numbness and tingling in her hands and feet.  Does have carpal tunnel.  Does have a lot of anxiety with her diagnosis.  States that she has been reading up on the Internet more than she should.  Remainder of comprehensive review of systems is negative.      PAST MEDICAL HISTORY:   1.  Pleomorphic lobular carcinoma in situ, see above.   2.  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   3.  Tachycardia, unspecified.   4.  Postsurgical hypothyroidism.   5.  Papillary thyroid carcinoma.   6.  Left lung granuloma.   7.  Lesion, right parietal lobe of brain.   8.  Depression.      FAMILY HISTORY:  Paternal and maternal grandfathers both had cancer; she does not know anything more specific.      SOCIAL HISTORY:  , does not have any children.  Currently smoking about half pack a day, but was up to 2 packs a day, trying to quit.  Denies alcohol use.  Previously was a PTA, currently on long-term disability.      GYNECOLOGY HISTORY:   0.  Menarche at 13.  Last menstrual cycle, the patient is unsure, but she is having it about twice a month.  Denies oral contraceptives.  No hormone replacement therapy.      PHYSICAL EXAM:  GENERAL: Comfortable, no acute distress.   HEAD: Atraumatic/Normocephalic.  EYES: Sclera non-icteric. Grossly normal.  RESPIRATORY: Normal breathing, non-labored. No audible wheezes/cough.  SKIN: No jaundice, discoloration or obvious  lesions.  NERUO: No tremors visible. Normal speech.  PSYCH: Alert, oriented. Answered questions appropriately.    The rest of a comprehensive physical examination is deferred due to PHE (public health emergency) video visit restrictions.     IMAGING:  As stated in the HPI.      PATHOLOGY:  As stated in the HPI.      ASSESSMENT AND PLAN:  Fallon Johnson is a 37-year-old female with newly diagnosed pleomorphic lobular carcinoma in situ.  I discussed at length with the patient regarding her diagnosis, prognosis, and treatment options.  I explained to the patient she does not have invasive cancer based on the excisional biopsies.  Reviewed that lobular carcinoma in situ is a marker developing invasive cancer and the risk is about 1% per year.  The options are:      1.  Consider bilateral mastectomy to decrease her risk of recurrence.     2.  Chemo prevention with tamoxifen.  Reviewed potential risk of toxicity not limited to hot flashes, vaginal dryness, weight gain, swelling, decreased libido, increased risk for cataracts, increased risk for uterine cancer, increased risk for thromboembolic events, which can be fatal.     3.  Observation.        The patient was quite concerned about the potential side effects of the tamoxifen.  She had been leaning towards a mastectomy.  At this point, I advised the patient to review all these options in detail.  She does not need to make a decision today.  The patient does state that she is leaning towards a mastectomy.  Advised her to discuss this further with Dr. Brewer, especially if she is considering reconstruction.  The patient assured me she is planning to talk to Dr. Brewer later today.  I will also attempt to speak to Dr. Brewer regarding our visit today.      At the end of the discussion, the plan was for the patient to follow up with Dr. Brewer.  If she chooses chemoprevention, she will return to see us.  We will defer to the patient to make the appointment if  she wishes in the future.     We did also discuss possible genetics referral, which patient would like to also discuss further with Dr. Brewer before proceeding.      At the end of discussion, all questions were addressed to the best of my ability.  The patient has verbalized understanding and agreeable with the plan.      Dr. Brewer, thank you for giving me the opportunity to participate in this delightful patient's care.  If you have any questions, please feel free to contact me.      Video-Visit Details    Type of service:  Video Visit    Video Start and End Time: 12:15 p.m. to 12:45 p.m.     Originating Location (pt. Location): Home    Distant Location (provider location):  [facility]     Mode of Communication:  Video Conference via IMASTE       BRYCE WRIGHT MD    Addendum: Noted telephone encounter between patient and Dr. Brewer's office planning on bilateral mastectomies.     D: 2020   T: 2020   MT: SHERLY      Name:     BRIDGET HARRIS   MRN:      [id number]        Account:      [id number]   :      1982           Visit Date:   2020      Document: [id number]       cc: Rose Brewer MD       Again, thank you for allowing me to participate in the care of your patient.        Sincerely,        Bryce Wright MD     Pt arrives to ED Tr A A&Ox4 and ambulatory c/o R testicular pain that began on the morning of 8/12. Pt states that the pain initially onset for approx 1 hour then subsided then later into the day the pain returned and was more severe. Pt denies having painful or burning urination. Pt denies heavy lifting or trauma to site. Testicles are not swollen or red upon evaluation. Respirations are even and unlabored. Pt denies fevers, chills, abd pain, nausea, vomiting, diarrhea, SOB, chcest pain. Urine labs drawn and sent.

## 2024-08-13 NOTE — ED ADULT NURSE REASSESSMENT NOTE - NS ED NURSE REASSESS COMMENT FT1
Pt resting comfortably in stretcher, vitals as charted, respirations are even and unlabored. Pt continues to state that they do not want pain medication. Pt endorsing no complaints at this time. Awaiting US-r

## 2024-08-13 NOTE — ED PROVIDER NOTE - ATTENDING CONTRIBUTION TO CARE
Forwarding to physician. Agree with resident note  49-year-old male with no known medical history, possibly chronic history of testicular pain, has seen urology in the past for this presents with acute onset of right-sided testicular pain which is now resolved.  States had 2 episodes throughout the day.  Both lasted approximately 30 minutes with intense nausea.  Denies any fevers.  Patient is sexually active with his wife.  No trauma to the area.  Denies fevers or penile discharge.  Physical exam  Gen: pt well appearing in no respiratory distress  vital signs stable  NCAT  Lungs: CTAB/L  Cardiac: s1 s2 no m/r/g  abdomen: soft/NT/ND   chaperoned by DOUGLAS Odell, no swelling, tenderness along epididymis  Impression  Testicular pain which now resolved will get UA and ultrasound, differential includes epididymitis versus torsion will reassess after ultrasound is complete  ext: no edema  Neuro: CNs intact 5/5 motor UE and LE; sensation intact; gait stable  skin: no rash

## 2024-08-13 NOTE — ED PROVIDER NOTE - OBJECTIVE STATEMENT
49-year-old male, denies any chronic medical conditions or daily medications, presents with right testicular pain for 1 day.  Reports he was at rest earlier yesterday morning when he had sudden onset severe right testicular pain radiating up his right abdomen.  Caused him to gag.  Self resolved after approximately 30 minutes.  Pain reoccurred at 11:30 PM last night while patient was at rest again but was just the right testicle.  Less severe but patient came to be evaluated.  Denies any fevers, urinary symptoms, new sexual partners, diarrhea or constipation. Has had right testicular pain in the past but this episode is more severe.

## 2024-08-13 NOTE — ED PROVIDER NOTE - PHYSICAL EXAMINATION
Physical Exam:  Gen: NAD, AOx3, non-toxic appearing, able to ambulate without assistance  Head: NCAT  HEENT: EOMI, PEERLA, normal conjunctiva, tongue midline, oral mucosa moist  Lung: CTAB, no respiratory distress, no wheezes/rhonchi/rales B/L, speaking in full sentences  CV: RRR, no murmurs, rubs or gallops  Abd: soft, NT, ND, no guarding, no rigidity, no rebound tenderness   (chaperoned by Dr. Celestine Hall): normal external male genitalia, no overlying erythema or lesions, no scrotal edema, mild tenderness to palpation of the right testicle, no high riding testicle  MSK: no visible deformities, ROM normal in UE/LE, no back pain  Neuro: No focal sensory or motor deficits  Skin: Warm, well perfused, no rash, no leg swelling  Psych: normal affect, calm

## 2024-08-13 NOTE — ED ADULT TRIAGE NOTE - MEANS OF ARRIVAL
Quality 47: Advance Care Plan: Advance Care Planning discussed and documented in the medical record; patient did not wish or was not able to name a surrogate decision maker or provide an advance care plan.
ambulatory
Quality 431: Preventive Care And Screening: Unhealthy Alcohol Use - Screening: Patient screened for unhealthy alcohol use using a single question and scores less than 2 times per year
Detail Level: Detailed
Quality 226: Preventive Care And Screening: Tobacco Use: Screening And Cessation Intervention: Patient screened for tobacco use and is an ex/non-smoker

## 2024-08-13 NOTE — ED PROVIDER NOTE - PROGRESS NOTE DETAILS
Una PGY3: Pt was reassessed and is doing well. Results, including any incidental findings, were discussed. Follow up and return precautions were discussed. Patient verbalized understanding

## 2024-08-13 NOTE — ED PROVIDER NOTE - CLINICAL SUMMARY MEDICAL DECISION MAKING FREE TEXT BOX
49-year-old male, denies any chronic medical conditions or daily medications, presents with right testicular pain for 1 day. Vitals nonactionable. Physical exam unremarkable, abdomen soft, nontender, normal external male genitalia, no overlying erythema or lesions, no scrotal edema, mild tenderness to palpation of the right testicle, no high riding testicle. Concern for epididymitis vs orchitis vs testicular torsion, will obtain screening testicular ultrasound and UA, reassess, patient declining pain meds at this time.

## 2024-10-18 ENCOUNTER — EMERGENCY (EMERGENCY)
Facility: HOSPITAL | Age: 49
LOS: 1 days | Discharge: HOME | End: 2024-10-18
Attending: EMERGENCY MEDICINE | Admitting: EMERGENCY MEDICINE
Payer: COMMERCIAL

## 2024-10-18 VITALS
DIASTOLIC BLOOD PRESSURE: 90 MMHG | SYSTOLIC BLOOD PRESSURE: 155 MMHG | HEART RATE: 71 BPM | OXYGEN SATURATION: 100 % | TEMPERATURE: 99 F | WEIGHT: 145.06 LBS | RESPIRATION RATE: 16 BRPM

## 2024-10-18 VITALS
HEART RATE: 74 BPM | DIASTOLIC BLOOD PRESSURE: 66 MMHG | RESPIRATION RATE: 16 BRPM | SYSTOLIC BLOOD PRESSURE: 117 MMHG | TEMPERATURE: 99 F | OXYGEN SATURATION: 99 %

## 2024-10-18 DIAGNOSIS — Z98.890 OTHER SPECIFIED POSTPROCEDURAL STATES: Chronic | ICD-10-CM

## 2024-10-18 LAB
ALBUMIN SERPL ELPH-MCNC: 4.4 G/DL — SIGNIFICANT CHANGE UP (ref 3.3–5)
ALP SERPL-CCNC: 95 U/L — SIGNIFICANT CHANGE UP (ref 40–120)
ALT FLD-CCNC: 29 U/L — SIGNIFICANT CHANGE UP (ref 4–41)
ANION GAP SERPL CALC-SCNC: 14 MMOL/L — SIGNIFICANT CHANGE UP (ref 7–14)
APPEARANCE UR: ABNORMAL
AST SERPL-CCNC: 36 U/L — SIGNIFICANT CHANGE UP (ref 4–40)
BASOPHILS # BLD AUTO: 0.05 K/UL — SIGNIFICANT CHANGE UP (ref 0–0.2)
BASOPHILS NFR BLD AUTO: 0.5 % — SIGNIFICANT CHANGE UP (ref 0–2)
BILIRUB SERPL-MCNC: 0.3 MG/DL — SIGNIFICANT CHANGE UP (ref 0.2–1.2)
BILIRUB UR-MCNC: NEGATIVE — SIGNIFICANT CHANGE UP
BLOOD GAS VENOUS COMPREHENSIVE RESULT: SIGNIFICANT CHANGE UP
BUN SERPL-MCNC: 9 MG/DL — SIGNIFICANT CHANGE UP (ref 7–23)
CALCIUM SERPL-MCNC: 9.3 MG/DL — SIGNIFICANT CHANGE UP (ref 8.4–10.5)
CHLORIDE SERPL-SCNC: 105 MMOL/L — SIGNIFICANT CHANGE UP (ref 98–107)
CO2 SERPL-SCNC: 21 MMOL/L — LOW (ref 22–31)
COLOR SPEC: SIGNIFICANT CHANGE UP
CREAT SERPL-MCNC: 1.16 MG/DL — SIGNIFICANT CHANGE UP (ref 0.5–1.3)
DIFF PNL FLD: ABNORMAL
EGFR: 77 ML/MIN/1.73M2 — SIGNIFICANT CHANGE UP
EOSINOPHIL # BLD AUTO: 0.33 K/UL — SIGNIFICANT CHANGE UP (ref 0–0.5)
EOSINOPHIL NFR BLD AUTO: 3.2 % — SIGNIFICANT CHANGE UP (ref 0–6)
GLUCOSE SERPL-MCNC: 127 MG/DL — HIGH (ref 70–99)
GLUCOSE UR QL: NEGATIVE MG/DL — SIGNIFICANT CHANGE UP
HCT VFR BLD CALC: 41.9 % — SIGNIFICANT CHANGE UP (ref 39–50)
HGB BLD-MCNC: 14.1 G/DL — SIGNIFICANT CHANGE UP (ref 13–17)
IANC: 7.82 K/UL — HIGH (ref 1.8–7.4)
IMM GRANULOCYTES NFR BLD AUTO: 0.4 % — SIGNIFICANT CHANGE UP (ref 0–0.9)
KETONES UR-MCNC: ABNORMAL MG/DL
LEUKOCYTE ESTERASE UR-ACNC: NEGATIVE — SIGNIFICANT CHANGE UP
LIDOCAIN IGE QN: 38 U/L — SIGNIFICANT CHANGE UP (ref 7–60)
LYMPHOCYTES # BLD AUTO: 1.48 K/UL — SIGNIFICANT CHANGE UP (ref 1–3.3)
LYMPHOCYTES # BLD AUTO: 14.4 % — SIGNIFICANT CHANGE UP (ref 13–44)
MCHC RBC-ENTMCNC: 31 PG — SIGNIFICANT CHANGE UP (ref 27–34)
MCHC RBC-ENTMCNC: 33.7 GM/DL — SIGNIFICANT CHANGE UP (ref 32–36)
MCV RBC AUTO: 92.1 FL — SIGNIFICANT CHANGE UP (ref 80–100)
MONOCYTES # BLD AUTO: 0.58 K/UL — SIGNIFICANT CHANGE UP (ref 0–0.9)
MONOCYTES NFR BLD AUTO: 5.6 % — SIGNIFICANT CHANGE UP (ref 2–14)
NEUTROPHILS # BLD AUTO: 7.82 K/UL — HIGH (ref 1.8–7.4)
NEUTROPHILS NFR BLD AUTO: 75.9 % — SIGNIFICANT CHANGE UP (ref 43–77)
NITRITE UR-MCNC: NEGATIVE — SIGNIFICANT CHANGE UP
NRBC # BLD: 0 /100 WBCS — SIGNIFICANT CHANGE UP (ref 0–0)
NRBC # FLD: 0 K/UL — SIGNIFICANT CHANGE UP (ref 0–0)
PH UR: 6 — SIGNIFICANT CHANGE UP (ref 5–8)
PLATELET # BLD AUTO: 272 K/UL — SIGNIFICANT CHANGE UP (ref 150–400)
POTASSIUM SERPL-MCNC: 4.4 MMOL/L — SIGNIFICANT CHANGE UP (ref 3.5–5.3)
POTASSIUM SERPL-SCNC: 4.4 MMOL/L — SIGNIFICANT CHANGE UP (ref 3.5–5.3)
PROT SERPL-MCNC: 6.6 G/DL — SIGNIFICANT CHANGE UP (ref 6–8.3)
PROT UR-MCNC: NEGATIVE MG/DL — SIGNIFICANT CHANGE UP
RBC # BLD: 4.55 M/UL — SIGNIFICANT CHANGE UP (ref 4.2–5.8)
RBC # FLD: 12.2 % — SIGNIFICANT CHANGE UP (ref 10.3–14.5)
SODIUM SERPL-SCNC: 140 MMOL/L — SIGNIFICANT CHANGE UP (ref 135–145)
SP GR SPEC: 1.02 — SIGNIFICANT CHANGE UP (ref 1–1.03)
UROBILINOGEN FLD QL: 0.2 MG/DL — SIGNIFICANT CHANGE UP (ref 0.2–1)
WBC # BLD: 10.3 K/UL — SIGNIFICANT CHANGE UP (ref 3.8–10.5)
WBC # FLD AUTO: 10.3 K/UL — SIGNIFICANT CHANGE UP (ref 3.8–10.5)

## 2024-10-18 PROCEDURE — 74176 CT ABD & PELVIS W/O CONTRAST: CPT | Mod: 26,GC,MC

## 2024-10-18 PROCEDURE — 99284 EMERGENCY DEPT VISIT MOD MDM: CPT

## 2024-10-18 RX ORDER — SODIUM CHLORIDE 0.9 % (FLUSH) 0.9 %
1000 SYRINGE (ML) INJECTION ONCE
Refills: 0 | Status: COMPLETED | OUTPATIENT
Start: 2024-10-18 | End: 2024-10-18

## 2024-10-18 RX ORDER — OXYCODONE HYDROCHLORIDE 30 MG/1
1 TABLET, FILM COATED, EXTENDED RELEASE ORAL
Qty: 6 | Refills: 0
Start: 2024-10-18

## 2024-10-18 RX ORDER — ONDANSETRON HCL/PF 4 MG/2 ML
4 VIAL (ML) INJECTION ONCE
Refills: 0 | Status: DISCONTINUED | OUTPATIENT
Start: 2024-10-18 | End: 2024-10-21

## 2024-10-18 RX ORDER — KETOROLAC TROMETHAMINE 10 MG/1
15 TABLET, FILM COATED ORAL ONCE
Refills: 0 | Status: DISCONTINUED | OUTPATIENT
Start: 2024-10-18 | End: 2024-10-18

## 2024-10-18 RX ORDER — ONDANSETRON HCL/PF 4 MG/2 ML
1 VIAL (ML) INJECTION
Qty: 1 | Refills: 0
Start: 2024-10-18

## 2024-10-18 RX ORDER — TAMSULOSIN HCL 0.4 MG
1 CAPSULE ORAL
Qty: 5 | Refills: 0
Start: 2024-10-18 | End: 2024-10-22

## 2024-10-18 RX ORDER — ACETAMINOPHEN 325 MG
2 TABLET ORAL
Qty: 40 | Refills: 0
Start: 2024-10-18

## 2024-10-18 RX ADMIN — KETOROLAC TROMETHAMINE 15 MILLIGRAM(S): 10 TABLET, FILM COATED ORAL at 01:49

## 2024-10-18 RX ADMIN — Medication 1000 MILLILITER(S): at 01:49

## 2024-10-18 NOTE — ED ADULT NURSE NOTE - PAIN RATING/NUMBER SCALE (0-10): ACTIVITY
Patient yelling out with sudden onset of back pain. PRN Tramadol given. Repositioned in bed. Patient very anxious. Emotional support given. Will monitor. 8 (severe pain)

## 2024-10-18 NOTE — ED PROVIDER NOTE - PHYSICAL EXAMINATION
CONST: Patient is non-ill appearing, alert, and in visible discomfort complaining of severe right back pain  HEENT: PERRL, head and neck atraumatic, no tenderness or obvious deformities  PULM: no acute respiratory distress, lung fields clear to auscultation bilaterally   CARDIO: regular rate and rhythm, no murmurs, rubs or gallops, normal S1, S2  ABDOMEN: bowel sounds appreciated in all 4 quadrants, right CVA tenderness and tenderness to the RLQ present, no guarding or rebound, non-distended  MSK: No tenderness or obvious deformity to bilateral upper or lower extremities  INTEGUMENTARY: no rash  EXTREMITIES: no lower extremity edema, pulses 2+ bilaterally  NEURO: no numbness or tingling present on bilaterally upper or lower extremities, strength at baseline  PSYCH: mood at baseline

## 2024-10-18 NOTE — ED PROVIDER NOTE - CLINICAL SUMMARY MEDICAL DECISION MAKING FREE TEXT BOX
50 y/o male w/ a PMH of kidney stones requiring stent and lithotripsy who presented to the ED for right flank pain that radiates to the groin since 10:45PM. Vitals are stable. On exam the patient is in visible discomfort with right CVA tenderness and RLQ tenderness which he states goes into his groin.    Nephrolithiasis possible due to prior hx of stones, flank pain radiating to groin, decreased urine output  Pancreatitis and biliary pathologies considered due to abdominal and back pain  Pyelonephritis possible due to CVA tenderness but less likely due to absence of fevers    CT w/o contrast ordered for eval of stone  CMP ordered for eval of biliary pathology  CBC ordered for eval of infection  UA ordered for eval of infection and hematuria  Toradol ordered for pain control, Zofran for nausea, 1L bolus NS 48 y/o male w/ a PMH of kidney stones requiring stent and lithotripsy who presented to the ED for right flank pain that radiates to the groin since 10:45PM. Vitals are stable. On exam the patient is in visible discomfort with right CVA tenderness and RLQ tenderness which he states goes into his groin.    Nephrolithiasis possible due to prior hx of stones, flank pain radiating to groin, decreased urine output  Pancreatitis and biliary pathologies considered due to abdominal and back pain  Pyelonephritis possible due to CVA tenderness but less likely due to absence of fevers    CT w/o contrast ordered for eval of stone  CMP ordered for eval of biliary pathology  CBC ordered for eval of infection  UA ordered for eval of infection and hematuria  Toradol ordered for pain control, Zofran for nausea, 1L bolus NS    CBC and CMP were unremarkable. UA showed RBCs and CT showed right perinephric fat stranding and mild hydronephrosis and hydroureter secondary to a 7 mm obstructing stone at the distal right ureter.

## 2024-10-18 NOTE — ED PROVIDER NOTE - NSFOLLOWUPCLINICS_GEN_ALL_ED_FT
[facility]  [address] [place] [address], [place] [address]  Phone: [phone]  Fax:

## 2024-10-18 NOTE — ED ADULT TRIAGE NOTE - CHIEF COMPLAINT QUOTE
Pt c/o sharp right sided flank pain radiating down to groin and nausea since 11pm. pt has hx of kidney stones and states it feels similar. No complaints of chest pain, headache, nausea, dizziness, vomiting  SOB, fever, chills verbalized..

## 2024-10-18 NOTE — ED PROVIDER NOTE - ATTENDING CONTRIBUTION TO CARE
49-year-old male with no reported past medical history but history of left kidney stones requiring stents and lithotripsy many years ago that presents with right flank pain starting suddenly approximately 2 hours prior to arrival.  Patient states that he was at rest when he had sudden onset right lower flank pain rating to his groin with urgency to urinate but no hematuria also has a sensation of needing to have a bowel movement but not able to.  Feels nauseous and had two episodes of emesis but now just nauseas without vomiting in ED.  Has not tried any medications for this condition.  Feels like his previous kidney stones.  Denies any chest pain shortness of breath paresthesias no recent illnesses has been in his usual state of health.  No smoking no drinking no drugs.  No foreign travel no sick contacts no fevers or chills.    Uncomfortable appearing pacing holding right flank.  Positive CVA tenderness tenderness to the right lower quadrant.  Otherwise soft nontender abdomen heart regular rate and rhythm lungs are clear to auscultation no pitting edema bilateral lower extremities.    49-year-old male with no reported past medical history but history of left kidney stones that is here for right flank pain which is reminiscent of his previous kidney stones per patient report.  No medications taken prior to arrival.  Patient appears uncomfortable has positive CVA tenderness likely kidney stones but given right-sided pain also consider appendicitis although does not present this acutely and is usually not associated with increased frequency or urgency of urination like kidney stones do.  At this time we will work on the diagnosis of kidney stones with CT imaging obtain labs provide pain control and nausea control IV fluids and reassess

## 2024-10-18 NOTE — ED PROVIDER NOTE - OBJECTIVE STATEMENT
Mr. Kash Florez is a 50 y/o male w/ a PMH of kidney stones requiring stent and lithotripsy who presents to the ED for right flank pain and abdominal pain. Around 10:45 PM the patient was drinking water when he started experiencing some right flank pain. The pain quickly increased to the point where he began feeling nauseous and vomited. The pain is constant, starts in the right flank and wraps around to the groin. The patient has been able to stool and urinate but has a persistent urge to urinate and defecate. He has not taken anything for the pain but stated that this pain is similar to the last time he had a kidney stone. He denied any fevers, CP, SOB, or headaches.

## 2024-10-18 NOTE — ED PROVIDER NOTE - NSFOLLOWUPINSTRUCTIONS_ED_ALL_ED_FT
Please follow up with a urologist. Information for the [facility] is included.    You have been sent one or more prescriptions to your pharmacy. The dosing and frequency are included in this paperwork. Please take each medication as instructed.    Return to the emergency department for severe pain, persistent vomiting, other concerning symptoms.

## 2024-10-18 NOTE — ED ADULT NURSE REASSESSMENT NOTE - NS ED NURSE REASSESS COMMENT FT1
Pt resting in stretcher, no acute distress noted. Pt offers no complaints at this time. VS as noted. respirations even and unlabored. pending CT scan. safety maintained throughout

## 2024-10-18 NOTE — ED PROVIDER NOTE - PATIENT PORTAL LINK FT
You can access the FollowMyHealth Patient Portal offered by [facility] by registering at the following website: [email or web address]. By joining [facility]’s FollowMyHealth portal, you will also be able to view your health information using other applications (apps) compatible with our system.

## 2024-10-18 NOTE — ED PROVIDER NOTE - PROGRESS NOTE DETAILS
Dexter Last MD: Patient has 7mm stone with microscopic hematuria, pain currently 1/10 and patient tolerating PO. Will discharge with urology follow up.

## 2024-10-18 NOTE — ED ADULT NURSE NOTE - PRIMARY CARE PROVIDER
Patient came into the clinic today to express her concern with her daughter having a MRSA outbreak. She thought that we might want to give her some antibiotics to protect her due to her surgery coming up. Dr Jamin Bonilla said that he will give her some Vanco if need be and to not worry about it. N/A

## 2024-10-22 ENCOUNTER — NON-APPOINTMENT (OUTPATIENT)
Age: 49
End: 2024-10-22

## 2024-10-23 ENCOUNTER — LABORATORY RESULT (OUTPATIENT)
Age: 49
End: 2024-10-23

## 2024-10-23 ENCOUNTER — APPOINTMENT (OUTPATIENT)
Dept: UROLOGY | Facility: CLINIC | Age: 49
End: 2024-10-23
Payer: COMMERCIAL

## 2024-10-23 VITALS
SYSTOLIC BLOOD PRESSURE: 124 MMHG | HEIGHT: 63 IN | DIASTOLIC BLOOD PRESSURE: 74 MMHG | RESPIRATION RATE: 16 BRPM | HEART RATE: 62 BPM | WEIGHT: 157.63 LBS | OXYGEN SATURATION: 98 % | BODY MASS INDEX: 27.93 KG/M2 | TEMPERATURE: 97.5 F

## 2024-10-23 DIAGNOSIS — N20.0 CALCULUS OF KIDNEY: ICD-10-CM

## 2024-10-23 PROCEDURE — 99214 OFFICE O/P EST MOD 30 MIN: CPT

## 2024-10-23 RX ORDER — TAMSULOSIN HYDROCHLORIDE 0.4 MG/1
0.4 CAPSULE ORAL
Qty: 30 | Refills: 6 | Status: ACTIVE | COMMUNITY
Start: 2024-10-23 | End: 1900-01-01

## 2024-10-25 ENCOUNTER — APPOINTMENT (OUTPATIENT)
Dept: RADIOLOGY | Facility: CLINIC | Age: 49
End: 2024-10-25
Payer: COMMERCIAL

## 2024-10-25 ENCOUNTER — OUTPATIENT (OUTPATIENT)
Dept: OUTPATIENT SERVICES | Facility: HOSPITAL | Age: 49
LOS: 1 days | End: 2024-10-25
Payer: COMMERCIAL

## 2024-10-25 DIAGNOSIS — Z98.890 OTHER SPECIFIED POSTPROCEDURAL STATES: Chronic | ICD-10-CM

## 2024-10-25 DIAGNOSIS — Z00.00 ENCOUNTER FOR GENERAL ADULT MEDICAL EXAMINATION WITHOUT ABNORMAL FINDINGS: ICD-10-CM

## 2024-10-25 LAB
APPEARANCE: ABNORMAL
BILIRUBIN URINE: NEGATIVE
BLOOD URINE: NEGATIVE
COLOR: NORMAL
GLUCOSE QUALITATIVE U: NEGATIVE MG/DL
KETONES URINE: 15 MG/DL
LEUKOCYTE ESTERASE URINE: NEGATIVE
NITRITE URINE: NEGATIVE
PH URINE: 5.5
PROTEIN URINE: NEGATIVE MG/DL
SPECIFIC GRAVITY URINE: 1.03
UROBILINOGEN URINE: 0.2 MG/DL

## 2024-10-25 PROCEDURE — 74018 RADEX ABDOMEN 1 VIEW: CPT

## 2024-10-25 PROCEDURE — 74018 RADEX ABDOMEN 1 VIEW: CPT | Mod: 26

## 2024-11-22 ENCOUNTER — OUTPATIENT (OUTPATIENT)
Dept: OUTPATIENT SERVICES | Facility: HOSPITAL | Age: 49
LOS: 1 days | End: 2024-11-22
Payer: COMMERCIAL

## 2024-11-22 VITALS
HEART RATE: 71 BPM | DIASTOLIC BLOOD PRESSURE: 82 MMHG | HEIGHT: 62 IN | WEIGHT: 141.54 LBS | RESPIRATION RATE: 18 BRPM | TEMPERATURE: 98 F | OXYGEN SATURATION: 99 % | SYSTOLIC BLOOD PRESSURE: 143 MMHG

## 2024-11-22 DIAGNOSIS — N20.0 CALCULUS OF KIDNEY: ICD-10-CM

## 2024-11-22 DIAGNOSIS — Z01.818 ENCOUNTER FOR OTHER PREPROCEDURAL EXAMINATION: ICD-10-CM

## 2024-11-22 DIAGNOSIS — Z98.890 OTHER SPECIFIED POSTPROCEDURAL STATES: Chronic | ICD-10-CM

## 2024-11-22 LAB
ANION GAP SERPL CALC-SCNC: 11 MMOL/L — SIGNIFICANT CHANGE UP (ref 5–17)
BUN SERPL-MCNC: 12 MG/DL — SIGNIFICANT CHANGE UP (ref 7–23)
CALCIUM SERPL-MCNC: 10 MG/DL — SIGNIFICANT CHANGE UP (ref 8.4–10.5)
CHLORIDE SERPL-SCNC: 103 MMOL/L — SIGNIFICANT CHANGE UP (ref 96–108)
CO2 SERPL-SCNC: 24 MMOL/L — SIGNIFICANT CHANGE UP (ref 22–31)
CREAT SERPL-MCNC: 0.94 MG/DL — SIGNIFICANT CHANGE UP (ref 0.5–1.3)
EGFR: 99 ML/MIN/1.73M2 — SIGNIFICANT CHANGE UP
GLUCOSE SERPL-MCNC: 90 MG/DL — SIGNIFICANT CHANGE UP (ref 70–99)
HCT VFR BLD CALC: 46.6 % — SIGNIFICANT CHANGE UP (ref 39–50)
HGB BLD-MCNC: 15.4 G/DL — SIGNIFICANT CHANGE UP (ref 13–17)
MCHC RBC-ENTMCNC: 31 PG — SIGNIFICANT CHANGE UP (ref 27–34)
MCHC RBC-ENTMCNC: 33 G/DL — SIGNIFICANT CHANGE UP (ref 32–36)
MCV RBC AUTO: 94 FL — SIGNIFICANT CHANGE UP (ref 80–100)
NRBC # BLD: 0 /100 WBCS — SIGNIFICANT CHANGE UP (ref 0–0)
PLATELET # BLD AUTO: 277 K/UL — SIGNIFICANT CHANGE UP (ref 150–400)
POTASSIUM SERPL-MCNC: 4.8 MMOL/L — SIGNIFICANT CHANGE UP (ref 3.5–5.3)
POTASSIUM SERPL-SCNC: 4.8 MMOL/L — SIGNIFICANT CHANGE UP (ref 3.5–5.3)
RBC # BLD: 4.96 M/UL — SIGNIFICANT CHANGE UP (ref 4.2–5.8)
RBC # FLD: 12.4 % — SIGNIFICANT CHANGE UP (ref 10.3–14.5)
SODIUM SERPL-SCNC: 138 MMOL/L — SIGNIFICANT CHANGE UP (ref 135–145)
WBC # BLD: 6.06 K/UL — SIGNIFICANT CHANGE UP (ref 3.8–10.5)
WBC # FLD AUTO: 6.06 K/UL — SIGNIFICANT CHANGE UP (ref 3.8–10.5)

## 2024-11-22 PROCEDURE — G0463: CPT

## 2024-11-22 PROCEDURE — 80048 BASIC METABOLIC PNL TOTAL CA: CPT

## 2024-11-22 PROCEDURE — 87086 URINE CULTURE/COLONY COUNT: CPT

## 2024-11-22 PROCEDURE — 85027 COMPLETE CBC AUTOMATED: CPT

## 2024-11-22 RX ORDER — SODIUM CHLORIDE 9 MG/ML
3 INJECTION, SOLUTION INTRAMUSCULAR; INTRAVENOUS; SUBCUTANEOUS EVERY 8 HOURS
Refills: 0 | Status: DISCONTINUED | OUTPATIENT
Start: 2024-12-03 | End: 2024-12-17

## 2024-11-22 RX ORDER — 0.9 % SODIUM CHLORIDE 0.9 %
1000 INTRAVENOUS SOLUTION INTRAVENOUS
Refills: 0 | Status: DISCONTINUED | OUTPATIENT
Start: 2024-12-03 | End: 2024-12-17

## 2024-11-22 NOTE — H&P PST ADULT - ATTENDING COMMENTS
pt denies seeing stone pass in interval no new symptoms.      nad ncat abd nd nt     to or for right eswl

## 2024-11-22 NOTE — H&P PST ADULT - HISTORY OF PRESENT ILLNESS
49 year old male presents to PST prior to scheduled Right Extracorporeal shock wave lithotripsy on 12/3/24 with Dr. Jesús Templeton. [facility]ney stones s/p cystoscopy/lithotripsy (4/2021). Endorses right flank pain for several months; imaging revealed "right kidney stone". Endorses intermittent chest pain since 2022 - attributes to reaction to Covid19 vaccine. Follows closely w/ cardiologist, Dr. Wiggins.   Denies dysuria, hematuria, fever/chills, palpitations, dizziness, syncope. Works as [employer].

## 2024-11-22 NOTE — H&P PST ADULT - ASSESSMENT
DASI score: 7.5  DASI activity: power walking 3x a week x 4 miles; all house chores - active w/ 8 year old son  Loose teeth or denture: denies

## 2024-11-22 NOTE — H&P PST ADULT - PROBLEM SELECTOR PLAN 1
Right extracorporeal shockwave lithotripsy on 12/3/24 with Dr. Jesús Templeton.  Pre-op instructions given. Questions answered.  Labs at PST: CBC, BMP, Urine cx.

## 2024-11-22 NOTE — H&P PST ADULT - NSICDXPASTMEDICALHX_GEN_ALL_CORE_FT
PAST MEDICAL HISTORY:  Allergic reaction to COVID-19 vaccine     H/O chest pain     History of BPH     Kidney stones     Renal colic on left side

## 2024-11-22 NOTE — H&P PST ADULT - NSICDXFAMILYHX_GEN_ALL_CORE_FT
FAMILY HISTORY:  Father  Still living? Unknown  FH: diabetes mellitus, Age at diagnosis: Age Unknown    Mother  Still living? Unknown  FH: heart disease, Age at diagnosis: Age Unknown    Sibling  Still living? Unknown  FH: HTN (hypertension), Age at diagnosis: Age Unknown

## 2024-11-23 LAB
CULTURE RESULTS: SIGNIFICANT CHANGE UP
SPECIMEN SOURCE: SIGNIFICANT CHANGE UP

## 2024-12-02 ENCOUNTER — NON-APPOINTMENT (OUTPATIENT)
Age: 49
End: 2024-12-02

## 2024-12-03 ENCOUNTER — TRANSCRIPTION ENCOUNTER (OUTPATIENT)
Age: 49
End: 2024-12-03

## 2024-12-03 ENCOUNTER — OUTPATIENT (OUTPATIENT)
Dept: OUTPATIENT SERVICES | Facility: HOSPITAL | Age: 49
LOS: 1 days | End: 2024-12-03
Payer: COMMERCIAL

## 2024-12-03 ENCOUNTER — APPOINTMENT (OUTPATIENT)
Dept: UROLOGY | Facility: HOSPITAL | Age: 49
End: 2024-12-03

## 2024-12-03 VITALS
SYSTOLIC BLOOD PRESSURE: 121 MMHG | TEMPERATURE: 97 F | OXYGEN SATURATION: 99 % | RESPIRATION RATE: 15 BRPM | DIASTOLIC BLOOD PRESSURE: 70 MMHG | HEART RATE: 58 BPM

## 2024-12-03 VITALS
WEIGHT: 141.54 LBS | HEIGHT: 62 IN | RESPIRATION RATE: 16 BRPM | HEART RATE: 68 BPM | DIASTOLIC BLOOD PRESSURE: 71 MMHG | TEMPERATURE: 98 F | SYSTOLIC BLOOD PRESSURE: 109 MMHG | OXYGEN SATURATION: 100 %

## 2024-12-03 DIAGNOSIS — Z98.890 OTHER SPECIFIED POSTPROCEDURAL STATES: Chronic | ICD-10-CM

## 2024-12-03 DIAGNOSIS — N20.0 CALCULUS OF KIDNEY: ICD-10-CM

## 2024-12-03 PROBLEM — T78.49XA OTHER ALLERGY, INITIAL ENCOUNTER: Chronic | Status: ACTIVE | Noted: 2024-11-22

## 2024-12-03 PROBLEM — Z87.898 PERSONAL HISTORY OF OTHER SPECIFIED CONDITIONS: Chronic | Status: ACTIVE | Noted: 2024-11-22

## 2024-12-03 PROCEDURE — 50590 FRAGMENTING OF KIDNEY STONE: CPT

## 2024-12-03 PROCEDURE — 50590 FRAGMENTING OF KIDNEY STONE: CPT | Mod: RT

## 2024-12-03 RX ORDER — TRAMADOL HYDROCHLORIDE 300 MG/1
1 CAPSULE ORAL
Qty: 12 | Refills: 0
Start: 2024-12-03 | End: 2024-12-05

## 2024-12-03 RX ORDER — LIDOCAINE HCL 20 MG/ML
0.2 VIAL (ML) INJECTION ONCE
Refills: 0 | Status: COMPLETED | OUTPATIENT
Start: 2024-12-03 | End: 2024-12-03

## 2024-12-03 RX ORDER — FENTANYL 12 UG/H
25 PATCH, EXTENDED RELEASE TRANSDERMAL
Refills: 0 | Status: DISCONTINUED | OUTPATIENT
Start: 2024-12-03 | End: 2024-12-03

## 2024-12-03 RX ORDER — ONDANSETRON HYDROCHLORIDE 4 MG/1
4 TABLET, FILM COATED ORAL ONCE
Refills: 0 | Status: DISCONTINUED | OUTPATIENT
Start: 2024-12-03 | End: 2024-12-17

## 2024-12-03 RX ORDER — ALFUZOSIN HYDROCHLORIDE 10 MG/1
1 TABLET ORAL
Qty: 30 | Refills: 0
Start: 2024-12-03 | End: 2025-01-01

## 2024-12-03 NOTE — ASU DISCHARGE PLAN (ADULT/PEDIATRIC) - ASU DC SPECIAL INSTRUCTIONSFT
please provide urine strainer and instruct pt to strain all urine and bring passed fragements to clinic follow up. please provide urine strainer and instruct pt to strain all urine and bring passed fragments to clinic follow up.

## 2024-12-03 NOTE — ASU PATIENT PROFILE, ADULT - HOW PATIENT ADDRESSED, PROFILE
Kash

## 2024-12-03 NOTE — ASU DISCHARGE PLAN (ADULT/PEDIATRIC) - FINANCIAL ASSISTANCE
[facility] provides services at a reduced cost to those who are determined to be eligible through [facility]’s financial assistance program. Information regarding [facility]’s financial assistance program can be found by going to [email or web address] or by calling [phone].

## 2024-12-03 NOTE — ASU DISCHARGE PLAN (ADULT/PEDIATRIC) - NS MD DC FALL RISK RISK
For information on Fall & Injury Prevention, visit: [email or web address] OR  [email or web address] OR  https://www.cdc.gov/steadi/patient.html

## 2024-12-03 NOTE — ASU PATIENT PROFILE, ADULT - VISION (WITH CORRECTIVE LENSES IF THE PATIENT USUALLY WEARS THEM):
1 pair of eyeglasses in locker/Partially impaired: cannot see medication labels or newsprint, but can see obstacles in path, and the surrounding layout; can count fingers at arm's length

## 2024-12-03 NOTE — PRE-ANESTHESIA EVALUATION ADULT - NSANTHPMHFT_GEN_ALL_CORE
Denies current CP, SOB, UMAÑA, GERD. METS >4. Intermittent CP 2/2 covid vaccine per pt, neg cardiac w/u per pt.

## 2024-12-04 ENCOUNTER — NON-APPOINTMENT (OUTPATIENT)
Age: 49
End: 2024-12-04

## 2024-12-04 PROBLEM — Z87.438 PERSONAL HISTORY OF OTHER DISEASES OF MALE GENITAL ORGANS: Chronic | Status: ACTIVE | Noted: 2024-11-22

## 2024-12-18 ENCOUNTER — APPOINTMENT (OUTPATIENT)
Dept: UROLOGY | Facility: CLINIC | Age: 49
End: 2024-12-18
Payer: COMMERCIAL

## 2024-12-18 VITALS
HEIGHT: 63 IN | TEMPERATURE: 98 F | WEIGHT: 157 LBS | OXYGEN SATURATION: 100 % | RESPIRATION RATE: 16 BRPM | HEART RATE: 53 BPM | BODY MASS INDEX: 27.82 KG/M2 | SYSTOLIC BLOOD PRESSURE: 127 MMHG | DIASTOLIC BLOOD PRESSURE: 86 MMHG

## 2024-12-18 DIAGNOSIS — N20.0 CALCULUS OF KIDNEY: ICD-10-CM

## 2024-12-18 PROCEDURE — 99214 OFFICE O/P EST MOD 30 MIN: CPT | Mod: 24

## 2024-12-19 ENCOUNTER — NON-APPOINTMENT (OUTPATIENT)
Age: 49
End: 2024-12-19

## 2024-12-20 LAB
KIDNEY STONE SOURCE: NORMAL
NIDUS STONE QN: NORMAL
RESULT COMMENT: NORMAL

## 2025-08-18 ENCOUNTER — EMERGENCY (EMERGENCY)
Facility: HOSPITAL | Age: 50
LOS: 1 days | End: 2025-08-18
Attending: STUDENT IN AN ORGANIZED HEALTH CARE EDUCATION/TRAINING PROGRAM | Admitting: STUDENT IN AN ORGANIZED HEALTH CARE EDUCATION/TRAINING PROGRAM
Payer: COMMERCIAL

## 2025-08-18 VITALS
OXYGEN SATURATION: 98 % | HEART RATE: 84 BPM | RESPIRATION RATE: 17 BRPM | TEMPERATURE: 98 F | DIASTOLIC BLOOD PRESSURE: 83 MMHG | SYSTOLIC BLOOD PRESSURE: 167 MMHG | WEIGHT: 149.91 LBS

## 2025-08-18 DIAGNOSIS — Z98.890 OTHER SPECIFIED POSTPROCEDURAL STATES: Chronic | ICD-10-CM

## 2025-08-18 PROCEDURE — 93010 ELECTROCARDIOGRAM REPORT: CPT

## 2025-08-18 PROCEDURE — 99284 EMERGENCY DEPT VISIT MOD MDM: CPT

## 2025-09-15 ENCOUNTER — APPOINTMENT (OUTPATIENT)
Dept: RHEUMATOLOGY | Facility: CLINIC | Age: 50
End: 2025-09-15
Payer: COMMERCIAL

## 2025-09-15 ENCOUNTER — LABORATORY RESULT (OUTPATIENT)
Age: 50
End: 2025-09-15

## 2025-09-15 VITALS
SYSTOLIC BLOOD PRESSURE: 157 MMHG | TEMPERATURE: 97.3 F | BODY MASS INDEX: 25.1 KG/M2 | OXYGEN SATURATION: 100 % | DIASTOLIC BLOOD PRESSURE: 90 MMHG | HEIGHT: 64 IN | WEIGHT: 147 LBS | HEART RATE: 67 BPM

## 2025-09-15 DIAGNOSIS — R76.8 OTHER SPECIFIED ABNORMAL IMMUNOLOGICAL FINDINGS IN SERUM: ICD-10-CM

## 2025-09-15 DIAGNOSIS — R07.89 OTHER CHEST PAIN: ICD-10-CM

## 2025-09-15 PROCEDURE — 36415 COLL VENOUS BLD VENIPUNCTURE: CPT

## 2025-09-15 PROCEDURE — 99204 OFFICE O/P NEW MOD 45 MIN: CPT

## 2025-09-16 ENCOUNTER — TRANSCRIPTION ENCOUNTER (OUTPATIENT)
Age: 50
End: 2025-09-16

## 2025-09-16 LAB
C3 SERPL-MCNC: 123 MG/DL
C4 SERPL-MCNC: 25 MG/DL
CK SERPL-CCNC: 144 U/L
DSDNA AB SER-ACNC: 1 IU/ML
ENA JO1 AB SER-ACNC: <0.2 AL
ENA RNP AB SER-ACNC: <0.2 AL
ENA SCL70 AB SER-ACNC: 1.3 AL
ENA SM AB SER-ACNC: <0.2 AL
ENA SS-A AB SER-ACNC: <0.2 AL
ENA SS-B AB SER-ACNC: <0.2 AL
THYROGLOB AB SERPL-ACNC: 17.6 IU/ML
THYROPEROXIDASE AB SERPL IA-ACNC: 13.6 IU/ML

## 2025-09-21 LAB — ANA TITR SER: NEGATIVE

## (undated) DEVICE — WARMING BLANKET UPPER ADULT

## (undated) DEVICE — POSITIONER FOAM EGG CRATE ULNAR 2PCS (PINK)

## (undated) DEVICE — VENODYNE/SCD SLEEVE CALF LARGE